# Patient Record
Sex: FEMALE | Race: WHITE | Employment: OTHER | URBAN - NONMETROPOLITAN AREA
[De-identification: names, ages, dates, MRNs, and addresses within clinical notes are randomized per-mention and may not be internally consistent; named-entity substitution may affect disease eponyms.]

---

## 2017-09-20 RX ORDER — SUMATRIPTAN 100 MG/1
100 TABLET, FILM COATED ORAL
Qty: 9 TABLET | Refills: 0 | Status: SHIPPED | OUTPATIENT
Start: 2017-09-20 | End: 2018-02-06 | Stop reason: SDUPTHER

## 2018-02-06 RX ORDER — SUMATRIPTAN 100 MG/1
100 TABLET, FILM COATED ORAL
Qty: 9 TABLET | Refills: 0 | Status: SHIPPED | OUTPATIENT
Start: 2018-02-06 | End: 2018-03-08 | Stop reason: SDUPTHER

## 2018-03-08 ENCOUNTER — OFFICE VISIT (OUTPATIENT)
Dept: FAMILY MEDICINE CLINIC | Age: 63
End: 2018-03-08
Payer: COMMERCIAL

## 2018-03-08 VITALS
HEIGHT: 67 IN | BODY MASS INDEX: 35.09 KG/M2 | RESPIRATION RATE: 12 BRPM | HEART RATE: 68 BPM | SYSTOLIC BLOOD PRESSURE: 126 MMHG | DIASTOLIC BLOOD PRESSURE: 88 MMHG | WEIGHT: 223.6 LBS

## 2018-03-08 DIAGNOSIS — Z12.31 ENCOUNTER FOR SCREENING MAMMOGRAM FOR MALIGNANT NEOPLASM OF BREAST: ICD-10-CM

## 2018-03-08 DIAGNOSIS — R53.83 OTHER FATIGUE: ICD-10-CM

## 2018-03-08 DIAGNOSIS — Z00.00 ANNUAL PHYSICAL EXAM: Primary | ICD-10-CM

## 2018-03-08 DIAGNOSIS — E66.9 CLASS 2 OBESITY WITHOUT SERIOUS COMORBIDITY WITH BODY MASS INDEX (BMI) OF 35.0 TO 35.9 IN ADULT, UNSPECIFIED OBESITY TYPE: ICD-10-CM

## 2018-03-08 DIAGNOSIS — G43.809 OTHER MIGRAINE WITHOUT STATUS MIGRAINOSUS, NOT INTRACTABLE: ICD-10-CM

## 2018-03-08 DIAGNOSIS — Z12.11 SCREENING FOR COLON CANCER: ICD-10-CM

## 2018-03-08 PROBLEM — E66.812 CLASS 2 OBESITY WITHOUT SERIOUS COMORBIDITY WITH BODY MASS INDEX (BMI) OF 35.0 TO 35.9 IN ADULT: Status: ACTIVE | Noted: 2018-03-08

## 2018-03-08 PROCEDURE — 99396 PREV VISIT EST AGE 40-64: CPT | Performed by: FAMILY MEDICINE

## 2018-03-08 RX ORDER — SUMATRIPTAN 100 MG/1
100 TABLET, FILM COATED ORAL
Qty: 9 TABLET | Refills: 5 | Status: SHIPPED | OUTPATIENT
Start: 2018-03-08 | End: 2019-09-12 | Stop reason: SDUPTHER

## 2018-03-08 ASSESSMENT — ENCOUNTER SYMPTOMS
DIARRHEA: 0
EYES NEGATIVE: 1
VOMITING: 0
SHORTNESS OF BREATH: 0
NAUSEA: 0
ABDOMINAL PAIN: 0
BLOOD IN STOOL: 0

## 2018-03-08 ASSESSMENT — PATIENT HEALTH QUESTIONNAIRE - PHQ9
SUM OF ALL RESPONSES TO PHQ9 QUESTIONS 1 & 2: 0
2. FEELING DOWN, DEPRESSED OR HOPELESS: 0
1. LITTLE INTEREST OR PLEASURE IN DOING THINGS: 0
SUM OF ALL RESPONSES TO PHQ QUESTIONS 1-9: 0

## 2018-03-14 ENCOUNTER — TELEPHONE (OUTPATIENT)
Dept: FAMILY MEDICINE CLINIC | Age: 63
End: 2018-03-14

## 2018-03-14 ENCOUNTER — NURSE ONLY (OUTPATIENT)
Dept: FAMILY MEDICINE CLINIC | Age: 63
End: 2018-03-14
Payer: COMMERCIAL

## 2018-03-14 DIAGNOSIS — Z12.11 SCREENING FOR COLON CANCER: Primary | ICD-10-CM

## 2018-03-14 DIAGNOSIS — Z00.00 ANNUAL PHYSICAL EXAM: ICD-10-CM

## 2018-03-14 LAB
CONTROL: POSITIVE
HEMOCCULT STL QL: NEGATIVE

## 2018-03-14 PROCEDURE — 36415 COLL VENOUS BLD VENIPUNCTURE: CPT | Performed by: FAMILY MEDICINE

## 2018-03-14 PROCEDURE — 82274 ASSAY TEST FOR BLOOD FECAL: CPT | Performed by: FAMILY MEDICINE

## 2018-03-15 LAB
ABSOLUTE BASO #: 0 K/UL (ref 0–0.1)
ABSOLUTE EOS #: 0 K/UL (ref 0.1–0.4)
ABSOLUTE LYMPH #: 1 K/UL (ref 0.8–5.2)
ABSOLUTE MONO #: 0.4 K/UL (ref 0.1–0.9)
ABSOLUTE NEUT #: 3.1 K/UL (ref 1.3–9.1)
BASOPHILS RELATIVE PERCENT: 0.4 %
EOSINOPHILS RELATIVE PERCENT: 0.9 %
HCT VFR BLD CALC: 40.6 % (ref 36–48)
HEMOGLOBIN: 13.6 G/DL (ref 12–16)
LYMPHOCYTE %: 22.8 %
MCH RBC QN AUTO: 30.4 PG (ref 27–34)
MCHC RBC AUTO-ENTMCNC: 33.5 G/DL (ref 31–36)
MCV RBC AUTO: 90.8 FL (ref 80–100)
MONOCYTES # BLD: 8.8 %
NEUTROPHILS RELATIVE PERCENT: 66.9 %
PDW BLD-RTO: 13.3 % (ref 10.8–14.8)
PLATELETS: 210 K/UL (ref 150–450)
RBC: 4.47 M/UL (ref 4–5.5)
WBC: 4.6 K/UL (ref 3.7–10.8)

## 2018-03-16 ENCOUNTER — TELEPHONE (OUTPATIENT)
Dept: FAMILY MEDICINE CLINIC | Age: 63
End: 2018-03-16

## 2018-03-16 LAB
ALBUMIN SERPL-MCNC: 4.3 G/DL (ref 3.5–5.2)
ALK PHOSPHATASE: 84 U/L (ref 30–134)
ALT SERPL-CCNC: 19 U/L (ref 5–40)
ANION GAP SERPL CALCULATED.3IONS-SCNC: 10 MEQ/L (ref 10–19)
AST SERPL-CCNC: 18 U/L (ref 9–40)
BILIRUB SERPL-MCNC: 0.4 MG/DL
BUN BLDV-MCNC: 17 MG/DL (ref 8–23)
CALCIUM SERPL-MCNC: 9.3 MG/DL (ref 8.5–10.5)
CHLORIDE BLD-SCNC: 104 MEQ/L (ref 95–107)
CHOLESTEROL/HDL RATIO: 3
CHOLESTEROL: 221 MG/DL
CO2: 27 MEQ/L (ref 19–31)
CREAT SERPL-MCNC: 0.8 MG/DL (ref 0.6–1.3)
EGFR AFRICAN AMERICAN: 91.6 ML/MIN/1.73 M2
EGFR IF NONAFRICAN AMERICAN: 79 ML/MIN/1.73 M2
GLUCOSE: 87 MG/DL (ref 70–99)
HDLC SERPL-MCNC: 73 MG/DL
LDL CHOLESTEROL CALCULATED: 129 MG/DL
LDL/HDL RATIO: 1.8
POTASSIUM SERPL-SCNC: 4.9 MEQ/L (ref 3.5–5.4)
SODIUM BLD-SCNC: 141 MEQ/L (ref 135–146)
T4 FREE: 1.1 NG/DL (ref 0.8–1.9)
TOTAL PROTEIN: 7 G/DL (ref 6.1–8.3)
TRIGL SERPL-MCNC: 95 MG/DL
TSH SERPL DL<=0.05 MIU/L-ACNC: 1.88 UIU/ML (ref 0.4–4.1)
VLDLC SERPL CALC-MCNC: 19 MG/DL

## 2019-09-12 DIAGNOSIS — G43.809 OTHER MIGRAINE WITHOUT STATUS MIGRAINOSUS, NOT INTRACTABLE: ICD-10-CM

## 2019-09-12 RX ORDER — SUMATRIPTAN 100 MG/1
TABLET, FILM COATED ORAL
Qty: 9 TABLET | Refills: 5 | Status: SHIPPED | OUTPATIENT
Start: 2019-09-12 | End: 2020-08-03 | Stop reason: SDUPTHER

## 2019-09-12 NOTE — TELEPHONE ENCOUNTER
Rx sent to pharmacy as below:    Requested Prescriptions     Signed Prescriptions Disp Refills    SUMAtriptan (IMITREX) 100 MG tablet 9 tablet 5     Sig: Take 1 po at the onset of headache;  May repeat in 2 hours x 1 if needed.      Authorizing Provider: Amy Gregorio           Electronically signed by Laya Marcial MD on 9/12/2019 at 10:46 AM

## 2019-09-16 ENCOUNTER — HOSPITAL ENCOUNTER (OUTPATIENT)
Dept: GENERAL RADIOLOGY | Age: 64
Discharge: HOME | End: 2019-09-16
Payer: COMMERCIAL

## 2019-09-16 ENCOUNTER — OFFICE VISIT (OUTPATIENT)
Dept: FAMILY MEDICINE CLINIC | Age: 64
End: 2019-09-16
Payer: COMMERCIAL

## 2019-09-16 ENCOUNTER — HOSPITAL ENCOUNTER (OUTPATIENT)
Age: 64
Discharge: HOME | End: 2019-09-16
Payer: COMMERCIAL

## 2019-09-16 VITALS
WEIGHT: 230 LBS | HEART RATE: 76 BPM | DIASTOLIC BLOOD PRESSURE: 84 MMHG | SYSTOLIC BLOOD PRESSURE: 132 MMHG | HEIGHT: 66 IN | BODY MASS INDEX: 36.96 KG/M2 | RESPIRATION RATE: 14 BRPM

## 2019-09-16 DIAGNOSIS — Z00.00 ANNUAL PHYSICAL EXAM: Primary | ICD-10-CM

## 2019-09-16 DIAGNOSIS — R53.83 OTHER FATIGUE: ICD-10-CM

## 2019-09-16 DIAGNOSIS — M54.2 NECK PAIN: ICD-10-CM

## 2019-09-16 DIAGNOSIS — G43.809 OTHER MIGRAINE WITHOUT STATUS MIGRAINOSUS, NOT INTRACTABLE: ICD-10-CM

## 2019-09-16 DIAGNOSIS — Z12.11 SCREENING FOR COLON CANCER: ICD-10-CM

## 2019-09-16 PROCEDURE — 72040 X-RAY EXAM NECK SPINE 2-3 VW: CPT

## 2019-09-16 PROCEDURE — 99396 PREV VISIT EST AGE 40-64: CPT | Performed by: FAMILY MEDICINE

## 2019-09-16 ASSESSMENT — PATIENT HEALTH QUESTIONNAIRE - PHQ9
SUM OF ALL RESPONSES TO PHQ QUESTIONS 1-9: 0
1. LITTLE INTEREST OR PLEASURE IN DOING THINGS: 0
SUM OF ALL RESPONSES TO PHQ QUESTIONS 1-9: 0
SUM OF ALL RESPONSES TO PHQ9 QUESTIONS 1 & 2: 0
2. FEELING DOWN, DEPRESSED OR HOPELESS: 0

## 2019-09-16 ASSESSMENT — ENCOUNTER SYMPTOMS
BLOOD IN STOOL: 0
SHORTNESS OF BREATH: 0
VOMITING: 0
ABDOMINAL PAIN: 0
NAUSEA: 0
EYES NEGATIVE: 1
DIARRHEA: 0

## 2019-09-16 NOTE — PROGRESS NOTES
Chief Complaint   Patient presents with    Annual Exam     dizziness, feels foggy        Melissa Patton is a 62 y. o.female    Pt presents for annual wellness physical exam.        Pt stable since last visit- no new problems for diagnoses listed below:  Patient Active Problem List   Diagnosis    Migraine headache    Mitral valve prolapse    Class 2 obesity without serious comorbidity with body mass index (BMI) of 35.0 to 35.9 in adult     C/o more headaches recently. She related them to her neck issues. C/o neck stiffness and tightness along with arm numbness, weakness and tingling. Her headaches are often posterior. imitrex helps when she uses it. She breaks 100mg tabs in quarters and the small dose helps. She denies any neck injury. C/o fatigue. Due for yearly labs. No changes in family history. Nonsmoker. Body mass index is 37.12 kg/m². Review of Systems   Constitutional: Positive for fatigue and unexpected weight change (gain). Negative for chills and fever. HENT: Negative. Eyes: Negative. Negative for visual disturbance. Respiratory: Negative for shortness of breath. Cardiovascular: Negative for chest pain, palpitations and leg swelling. Gastrointestinal: Negative for abdominal pain, blood in stool, diarrhea, nausea and vomiting. Genitourinary: Negative for dysuria. Musculoskeletal: Positive for neck pain. Negative for arthralgias and myalgias. Skin: Negative for rash. Neurological: Positive for headaches. Negative for dizziness. Hematological: Negative for adenopathy. Psychiatric/Behavioral: Negative. All other systems reviewed and are negative.           OBJECTIVE     /84   Pulse 76   Resp 14   Ht 5' 6\" (1.676 m)   Wt 230 lb (104.3 kg)   BMI 37.12 kg/m²     Wt Readings from Last 3 Encounters:   09/16/19 230 lb (104.3 kg)   03/08/18 223 lb 9.6 oz (101.4 kg)   05/31/16 190 lb (86.2 kg)       Physical Exam   Constitutional: She is

## 2019-09-17 ENCOUNTER — NURSE ONLY (OUTPATIENT)
Dept: FAMILY MEDICINE CLINIC | Age: 64
End: 2019-09-17
Payer: COMMERCIAL

## 2019-09-17 ENCOUNTER — TELEPHONE (OUTPATIENT)
Dept: FAMILY MEDICINE CLINIC | Age: 64
End: 2019-09-17

## 2019-09-17 DIAGNOSIS — R20.2 NUMBNESS AND TINGLING OF BOTH UPPER EXTREMITIES: Primary | ICD-10-CM

## 2019-09-17 DIAGNOSIS — R20.0 NUMBNESS AND TINGLING OF BOTH UPPER EXTREMITIES: Primary | ICD-10-CM

## 2019-09-17 DIAGNOSIS — Z00.00 ANNUAL PHYSICAL EXAM: ICD-10-CM

## 2019-09-17 DIAGNOSIS — R53.83 OTHER FATIGUE: ICD-10-CM

## 2019-09-17 LAB
ALBUMIN SERPL-MCNC: 4.2 G/DL (ref 3.5–5.1)
ALP BLD-CCNC: 83 U/L (ref 38–126)
ALT SERPL-CCNC: 13 U/L (ref 11–66)
ANION GAP SERPL CALCULATED.3IONS-SCNC: 8 MEQ/L (ref 8–16)
AST SERPL-CCNC: 18 U/L (ref 5–40)
BASOPHILS # BLD: 0.6 %
BASOPHILS ABSOLUTE: 0 THOU/MM3 (ref 0–0.1)
BILIRUB SERPL-MCNC: 0.4 MG/DL (ref 0.3–1.2)
BUN BLDV-MCNC: 13 MG/DL (ref 7–22)
CALCIUM SERPL-MCNC: 9.6 MG/DL (ref 8.5–10.5)
CHLORIDE BLD-SCNC: 107 MEQ/L (ref 98–111)
CHOLESTEROL, TOTAL: 216 MG/DL (ref 100–199)
CO2: 28 MEQ/L (ref 23–33)
CREAT SERPL-MCNC: 0.6 MG/DL (ref 0.4–1.2)
EOSINOPHIL # BLD: 1.5 %
EOSINOPHILS ABSOLUTE: 0 THOU/MM3 (ref 0–0.4)
ERYTHROCYTE [DISTWIDTH] IN BLOOD BY AUTOMATED COUNT: 13.7 % (ref 11.5–14.5)
ERYTHROCYTE [DISTWIDTH] IN BLOOD BY AUTOMATED COUNT: 47.1 FL (ref 35–45)
GFR SERPL CREATININE-BSD FRML MDRD: > 90 ML/MIN/1.73M2
GLUCOSE BLD-MCNC: 96 MG/DL (ref 70–108)
HCT VFR BLD CALC: 42.2 % (ref 37–47)
HDLC SERPL-MCNC: 70 MG/DL
HEMOGLOBIN: 13.7 GM/DL (ref 12–16)
IMMATURE GRANS (ABS): 0.01 THOU/MM3 (ref 0–0.07)
IMMATURE GRANULOCYTES: 0 %
LDL CHOLESTEROL CALCULATED: 131 MG/DL
LYMPHOCYTES # BLD: 27.6 %
LYMPHOCYTES ABSOLUTE: 0.9 THOU/MM3 (ref 1–4.8)
MCH RBC QN AUTO: 30.5 PG (ref 26–33)
MCHC RBC AUTO-ENTMCNC: 32.5 GM/DL (ref 32.2–35.5)
MCV RBC AUTO: 94 FL (ref 81–99)
MONOCYTES # BLD: 8.7 %
MONOCYTES ABSOLUTE: 0.3 THOU/MM3 (ref 0.4–1.3)
NUCLEATED RED BLOOD CELLS: 0 /100 WBC
PLATELET # BLD: 195 THOU/MM3 (ref 130–400)
PMV BLD AUTO: 11.8 FL (ref 9.4–12.4)
POTASSIUM SERPL-SCNC: 4.9 MEQ/L (ref 3.5–5.2)
RBC # BLD: 4.49 MILL/MM3 (ref 4.2–5.4)
SEG NEUTROPHILS: 61.3 %
SEGMENTED NEUTROPHILS ABSOLUTE COUNT: 2 THOU/MM3 (ref 1.8–7.7)
SODIUM BLD-SCNC: 143 MEQ/L (ref 135–145)
T4 FREE: 1.24 NG/DL (ref 0.93–1.76)
TOTAL PROTEIN: 7.5 G/DL (ref 6.1–8)
TRIGL SERPL-MCNC: 76 MG/DL (ref 0–199)
TSH SERPL DL<=0.05 MIU/L-ACNC: 1.57 UIU/ML (ref 0.4–4.2)
WBC # BLD: 3.2 THOU/MM3 (ref 4.8–10.8)

## 2019-09-17 PROCEDURE — 36415 COLL VENOUS BLD VENIPUNCTURE: CPT | Performed by: FAMILY MEDICINE

## 2019-09-17 NOTE — PROGRESS NOTES
Blood work drawn today in the office, venous puncture by Sondra Soto, [facility], right arm, pt tolerated well. FIT test and instructions given to Pt. Pt notified, per CG Xrays of the neck show slight disc space narrowing. Would recommend bilateral upper extremity EMG due to her hand and arm numbness. Pt is agreeable to EMG with Dr Pasha Landers, orders placed, they will contact her to schedule.

## 2019-09-18 ENCOUNTER — TELEPHONE (OUTPATIENT)
Dept: FAMILY MEDICINE CLINIC | Age: 64
End: 2019-09-18

## 2019-09-18 DIAGNOSIS — D72.819 LEUKOPENIA, UNSPECIFIED TYPE: Primary | ICD-10-CM

## 2019-09-18 NOTE — TELEPHONE ENCOUNTER
----- Message from Rosa Mustafa MD sent at 9/17/2019  6:56 PM EDT -----  Notify her that her labs look ok except for slightly decreased WBC count at 3.2. Likely a transient decrease. Recheck CBC in 2-3 weeks.  CG

## 2019-09-18 NOTE — TELEPHONE ENCOUNTER
Per HIPAA, message left for pt notifying her of lab results, likely a transient decrease and to recheck CBC in 2-3 weeks.  Lab order mailed to pt

## 2019-10-09 ENCOUNTER — TELEPHONE (OUTPATIENT)
Dept: FAMILY MEDICINE CLINIC | Age: 64
End: 2019-10-09

## 2019-10-09 ENCOUNTER — NURSE ONLY (OUTPATIENT)
Dept: FAMILY MEDICINE CLINIC | Age: 64
End: 2019-10-09
Payer: COMMERCIAL

## 2019-10-09 DIAGNOSIS — D72.819 LEUKOPENIA, UNSPECIFIED TYPE: Primary | ICD-10-CM

## 2019-10-09 DIAGNOSIS — D72.819 LEUKOPENIA, UNSPECIFIED TYPE: ICD-10-CM

## 2019-10-09 LAB
BASOPHILS # BLD: 0.8 %
BASOPHILS ABSOLUTE: 0 THOU/MM3 (ref 0–0.1)
EOSINOPHIL # BLD: 1 %
EOSINOPHILS ABSOLUTE: 0 THOU/MM3 (ref 0–0.4)
ERYTHROCYTE [DISTWIDTH] IN BLOOD BY AUTOMATED COUNT: 13.9 % (ref 11.5–14.5)
ERYTHROCYTE [DISTWIDTH] IN BLOOD BY AUTOMATED COUNT: 48.3 FL (ref 35–45)
HCT VFR BLD CALC: 40.8 % (ref 37–47)
HEMOGLOBIN: 13.3 GM/DL (ref 12–16)
IMMATURE GRANS (ABS): 0 THOU/MM3 (ref 0–0.07)
IMMATURE GRANULOCYTES: 0 %
LYMPHOCYTES # BLD: 25.1 %
LYMPHOCYTES ABSOLUTE: 1 THOU/MM3 (ref 1–4.8)
MCH RBC QN AUTO: 30.7 PG (ref 26–33)
MCHC RBC AUTO-ENTMCNC: 32.6 GM/DL (ref 32.2–35.5)
MCV RBC AUTO: 94.2 FL (ref 81–99)
MONOCYTES # BLD: 12.6 %
MONOCYTES ABSOLUTE: 0.5 THOU/MM3 (ref 0.4–1.3)
NUCLEATED RED BLOOD CELLS: 0 /100 WBC
PLATELET # BLD: 191 THOU/MM3 (ref 130–400)
PMV BLD AUTO: 12.8 FL (ref 9.4–12.4)
RBC # BLD: 4.33 MILL/MM3 (ref 4.2–5.4)
SEG NEUTROPHILS: 60.5 %
SEGMENTED NEUTROPHILS ABSOLUTE COUNT: 2.3 THOU/MM3 (ref 1.8–7.7)
WBC # BLD: 3.8 THOU/MM3 (ref 4.8–10.8)

## 2019-10-09 PROCEDURE — 36415 COLL VENOUS BLD VENIPUNCTURE: CPT | Performed by: FAMILY MEDICINE

## 2019-10-15 ENCOUNTER — PROCEDURE VISIT (OUTPATIENT)
Dept: NEUROLOGY | Age: 64
End: 2019-10-15
Payer: COMMERCIAL

## 2019-10-15 ENCOUNTER — TELEPHONE (OUTPATIENT)
Dept: FAMILY MEDICINE CLINIC | Age: 64
End: 2019-10-15

## 2019-10-15 DIAGNOSIS — G56.01 RIGHT CARPAL TUNNEL SYNDROME: Primary | ICD-10-CM

## 2019-10-15 DIAGNOSIS — H53.133 SUDDEN VISUAL LOSS OF BOTH EYES: Primary | ICD-10-CM

## 2019-10-15 DIAGNOSIS — R20.0 BILATERAL HAND NUMBNESS: ICD-10-CM

## 2019-10-15 PROCEDURE — 95886 MUSC TEST DONE W/N TEST COMP: CPT | Performed by: PSYCHIATRY & NEUROLOGY

## 2019-10-15 PROCEDURE — 95911 NRV CNDJ TEST 9-10 STUDIES: CPT | Performed by: PSYCHIATRY & NEUROLOGY

## 2019-10-22 ENCOUNTER — TELEPHONE (OUTPATIENT)
Dept: FAMILY MEDICINE CLINIC | Age: 64
End: 2019-10-22

## 2019-10-23 ENCOUNTER — INITIAL CONSULT (OUTPATIENT)
Dept: NEUROLOGY | Age: 64
End: 2019-10-23
Payer: COMMERCIAL

## 2019-10-23 VITALS
HEIGHT: 66 IN | DIASTOLIC BLOOD PRESSURE: 82 MMHG | WEIGHT: 227 LBS | SYSTOLIC BLOOD PRESSURE: 128 MMHG | BODY MASS INDEX: 36.48 KG/M2 | HEART RATE: 72 BPM

## 2019-10-23 DIAGNOSIS — G43.009 MIGRAINE WITHOUT AURA AND WITHOUT STATUS MIGRAINOSUS, NOT INTRACTABLE: ICD-10-CM

## 2019-10-23 DIAGNOSIS — H53.133 VISION, LOSS, SUDDEN, BILATERAL: Primary | ICD-10-CM

## 2019-10-23 PROCEDURE — 99204 OFFICE O/P NEW MOD 45 MIN: CPT | Performed by: PSYCHIATRY & NEUROLOGY

## 2019-10-28 ENCOUNTER — TELEPHONE (OUTPATIENT)
Dept: FAMILY MEDICINE CLINIC | Age: 64
End: 2019-10-28

## 2019-10-29 ENCOUNTER — HOSPITAL ENCOUNTER (OUTPATIENT)
Dept: INTERVENTIONAL RADIOLOGY/VASCULAR | Age: 64
Discharge: HOME | End: 2019-10-29
Payer: COMMERCIAL

## 2019-10-29 DIAGNOSIS — H53.129 TRANSIENT VISUAL LOSS, UNSPECIFIED LATERALITY: ICD-10-CM

## 2019-10-29 PROCEDURE — 93880 EXTRACRANIAL BILAT STUDY: CPT

## 2019-10-31 ENCOUNTER — OFFICE VISIT (OUTPATIENT)
Dept: FAMILY MEDICINE CLINIC | Age: 64
End: 2019-10-31
Payer: COMMERCIAL

## 2019-10-31 VITALS
SYSTOLIC BLOOD PRESSURE: 132 MMHG | HEART RATE: 76 BPM | DIASTOLIC BLOOD PRESSURE: 88 MMHG | RESPIRATION RATE: 16 BRPM | WEIGHT: 226 LBS | BODY MASS INDEX: 36.48 KG/M2

## 2019-10-31 DIAGNOSIS — H35.60 RETINAL HEMORRHAGE, UNSPECIFIED LATERALITY: ICD-10-CM

## 2019-10-31 DIAGNOSIS — H53.123 TRANSIENT VISUAL LOSS OF BOTH EYES: Primary | ICD-10-CM

## 2019-10-31 LAB — HBA1C MFR BLD: 5.3 % (ref 4.3–5.7)

## 2019-10-31 PROCEDURE — 83036 HEMOGLOBIN GLYCOSYLATED A1C: CPT | Performed by: FAMILY MEDICINE

## 2019-10-31 PROCEDURE — 99213 OFFICE O/P EST LOW 20 MIN: CPT | Performed by: FAMILY MEDICINE

## 2019-10-31 ASSESSMENT — ENCOUNTER SYMPTOMS
SHORTNESS OF BREATH: 0
GASTROINTESTINAL NEGATIVE: 1

## 2019-11-12 ENCOUNTER — HOSPITAL ENCOUNTER (OUTPATIENT)
Dept: MRI IMAGING | Age: 64
Discharge: HOME | End: 2019-11-12
Payer: COMMERCIAL

## 2019-11-12 DIAGNOSIS — G43.009 MIGRAINE WITHOUT AURA AND WITHOUT STATUS MIGRAINOSUS, NOT INTRACTABLE: ICD-10-CM

## 2019-11-12 DIAGNOSIS — H53.133 VISION, LOSS, SUDDEN, BILATERAL: ICD-10-CM

## 2019-11-12 PROCEDURE — 6360000004 HC RX CONTRAST MEDICATION: Performed by: NURSE PRACTITIONER

## 2019-11-12 PROCEDURE — A9579 GAD-BASE MR CONTRAST NOS,1ML: HCPCS | Performed by: NURSE PRACTITIONER

## 2019-11-12 PROCEDURE — 70553 MRI BRAIN STEM W/O & W/DYE: CPT

## 2019-11-12 RX ADMIN — GADOTERIDOL 20 ML: 279.3 INJECTION, SOLUTION INTRAVENOUS at 12:48

## 2019-11-13 ENCOUNTER — TELEPHONE (OUTPATIENT)
Dept: NEUROLOGY | Age: 64
End: 2019-11-13

## 2019-11-21 ENCOUNTER — TELEPHONE (OUTPATIENT)
Dept: FAMILY MEDICINE CLINIC | Age: 64
End: 2019-11-21

## 2019-11-21 ENCOUNTER — NURSE ONLY (OUTPATIENT)
Dept: FAMILY MEDICINE CLINIC | Age: 64
End: 2019-11-21

## 2019-11-21 DIAGNOSIS — H53.123 TRANSIENT VISUAL LOSS OF BOTH EYES: Primary | ICD-10-CM

## 2019-11-21 DIAGNOSIS — H53.133 VISION, LOSS, SUDDEN, BILATERAL: ICD-10-CM

## 2019-11-21 DIAGNOSIS — G43.009 MIGRAINE WITHOUT AURA AND WITHOUT STATUS MIGRAINOSUS, NOT INTRACTABLE: ICD-10-CM

## 2019-11-21 DIAGNOSIS — D72.819 LEUKOPENIA, UNSPECIFIED TYPE: ICD-10-CM

## 2019-11-21 LAB
BASOPHILS # BLD: 0.8 %
BASOPHILS ABSOLUTE: 0 THOU/MM3 (ref 0–0.1)
EOSINOPHIL # BLD: 1.5 %
EOSINOPHILS ABSOLUTE: 0.1 THOU/MM3 (ref 0–0.4)
ERYTHROCYTE [DISTWIDTH] IN BLOOD BY AUTOMATED COUNT: 13.4 % (ref 11.5–14.5)
ERYTHROCYTE [DISTWIDTH] IN BLOOD BY AUTOMATED COUNT: 45.8 FL (ref 35–45)
FOLATE: 9.7 NG/ML (ref 4.8–24.2)
HCT VFR BLD CALC: 40.6 % (ref 37–47)
HEMOGLOBIN: 13.5 GM/DL (ref 12–16)
IMMATURE GRANS (ABS): 0.01 THOU/MM3 (ref 0–0.07)
IMMATURE GRANULOCYTES: 0.3 %
LYMPHOCYTES # BLD: 29.2 %
LYMPHOCYTES ABSOLUTE: 1.2 THOU/MM3 (ref 1–4.8)
MCH RBC QN AUTO: 31.2 PG (ref 26–33)
MCHC RBC AUTO-ENTMCNC: 33.3 GM/DL (ref 32.2–35.5)
MCV RBC AUTO: 93.8 FL (ref 81–99)
MONOCYTES # BLD: 10.8 %
MONOCYTES ABSOLUTE: 0.4 THOU/MM3 (ref 0.4–1.3)
NUCLEATED RED BLOOD CELLS: 0 /100 WBC
PLATELET # BLD: 190 THOU/MM3 (ref 130–400)
PMV BLD AUTO: 11.7 FL (ref 9.4–12.4)
RBC # BLD: 4.33 MILL/MM3 (ref 4.2–5.4)
SEG NEUTROPHILS: 57.4 %
SEGMENTED NEUTROPHILS ABSOLUTE COUNT: 2.3 THOU/MM3 (ref 1.8–7.7)
VITAMIN B-12: 532 PG/ML (ref 211–911)
WBC # BLD: 4 THOU/MM3 (ref 4.8–10.8)

## 2019-11-26 ENCOUNTER — OFFICE VISIT (OUTPATIENT)
Dept: NEUROLOGY | Age: 64
End: 2019-11-26
Payer: COMMERCIAL

## 2019-11-26 VITALS
DIASTOLIC BLOOD PRESSURE: 66 MMHG | SYSTOLIC BLOOD PRESSURE: 122 MMHG | HEART RATE: 62 BPM | WEIGHT: 226 LBS | HEIGHT: 66 IN | BODY MASS INDEX: 36.32 KG/M2

## 2019-11-26 DIAGNOSIS — G43.109 COMPLICATED MIGRAINE: Primary | ICD-10-CM

## 2019-11-26 PROCEDURE — 99213 OFFICE O/P EST LOW 20 MIN: CPT | Performed by: NURSE PRACTITIONER

## 2019-11-29 ENCOUNTER — HOSPITAL ENCOUNTER (OUTPATIENT)
Dept: NON INVASIVE DIAGNOSTICS | Age: 64
Discharge: HOME | End: 2019-11-29
Payer: COMMERCIAL

## 2019-11-29 DIAGNOSIS — H53.133 VISION, LOSS, SUDDEN, BILATERAL: ICD-10-CM

## 2019-11-29 DIAGNOSIS — H53.123 TRANSIENT VISUAL LOSS OF BOTH EYES: ICD-10-CM

## 2019-11-29 LAB
LV EF: 63 %
LVEF MODALITY: NORMAL

## 2019-11-29 PROCEDURE — 93306 TTE W/DOPPLER COMPLETE: CPT

## 2019-12-02 ENCOUNTER — TELEPHONE (OUTPATIENT)
Dept: FAMILY MEDICINE CLINIC | Age: 64
End: 2019-12-02

## 2020-08-03 RX ORDER — SUMATRIPTAN 100 MG/1
TABLET, FILM COATED ORAL
Qty: 9 TABLET | Refills: 1 | Status: SHIPPED | OUTPATIENT
Start: 2020-08-03 | End: 2020-09-28

## 2020-08-03 NOTE — TELEPHONE ENCOUNTER
Rx EP'd to pharmacy. Please notify patient. Requested Prescriptions     Signed Prescriptions Disp Refills    SUMAtriptan (IMITREX) 100 MG tablet 9 tablet 1     Sig: Take 1 po at the onset of headache;  May repeat in 2 hours x 1 if needed.      Authorizing Provider: Jessica Chicas           Electronically signed by Herbert Garcia MD on 8/3/2020 at 5:26 PM

## 2020-09-28 RX ORDER — SUMATRIPTAN 100 MG/1
TABLET, FILM COATED ORAL
Qty: 9 TABLET | Refills: 0 | Status: SHIPPED | OUTPATIENT
Start: 2020-09-28 | End: 2021-01-11 | Stop reason: SDUPTHER

## 2020-10-26 RX ORDER — SUMATRIPTAN 100 MG/1
TABLET, FILM COATED ORAL
Qty: 9 TABLET | Refills: 0 | Status: CANCELLED | OUTPATIENT
Start: 2020-10-26

## 2020-10-26 NOTE — TELEPHONE ENCOUNTER
This medication refill is regarding a refill  Refill requested by [facility]     Requested Prescriptions     Pending Prescriptions Disp Refills    SUMAtriptan (IMITREX) 100 MG tablet 9 tablet 0     Sig: TAKE 1 AT THE ONSET OF HEADACHE MAY REPEAT IN 2 HOURS X 1 IF NEEDED.        Date of last visit: Visit date not found  Date of next visit: Visit date not found  Date of last refill: 9/28/20  Pharmacy Name: [address] [place] [address]

## 2020-10-28 NOTE — TELEPHONE ENCOUNTER
Last seen 10/31/19.  No future appt    Called pt- she is not at home to see when she is available to schedule appt, she will call back to set up appt

## 2021-01-11 ENCOUNTER — OFFICE VISIT (OUTPATIENT)
Dept: FAMILY MEDICINE CLINIC | Age: 66
End: 2021-01-11
Payer: MEDICARE

## 2021-01-11 VITALS
HEART RATE: 84 BPM | BODY MASS INDEX: 37.12 KG/M2 | SYSTOLIC BLOOD PRESSURE: 112 MMHG | RESPIRATION RATE: 20 BRPM | DIASTOLIC BLOOD PRESSURE: 84 MMHG | WEIGHT: 230 LBS | TEMPERATURE: 97.6 F

## 2021-01-11 DIAGNOSIS — H61.21 EXCESSIVE CERUMEN IN RIGHT EAR CANAL: ICD-10-CM

## 2021-01-11 DIAGNOSIS — Z00.00 ANNUAL PHYSICAL EXAM: Primary | ICD-10-CM

## 2021-01-11 DIAGNOSIS — Z12.31 ENCOUNTER FOR SCREENING MAMMOGRAM FOR MALIGNANT NEOPLASM OF BREAST: ICD-10-CM

## 2021-01-11 DIAGNOSIS — R10.32 CHRONIC LLQ PAIN: ICD-10-CM

## 2021-01-11 DIAGNOSIS — G43.809 OTHER MIGRAINE WITHOUT STATUS MIGRAINOSUS, NOT INTRACTABLE: ICD-10-CM

## 2021-01-11 DIAGNOSIS — G89.29 CHRONIC LLQ PAIN: ICD-10-CM

## 2021-01-11 PROCEDURE — G0402 INITIAL PREVENTIVE EXAM: HCPCS | Performed by: FAMILY MEDICINE

## 2021-01-11 RX ORDER — IBUPROFEN 200 MG
200 TABLET ORAL EVERY 6 HOURS PRN
COMMUNITY

## 2021-01-11 RX ORDER — SUMATRIPTAN 100 MG/1
TABLET, FILM COATED ORAL
Qty: 9 TABLET | Refills: 11 | Status: SHIPPED | OUTPATIENT
Start: 2021-01-11 | End: 2022-05-24 | Stop reason: SDUPTHER

## 2021-01-11 RX ORDER — ACETAMINOPHEN 500 MG
500 TABLET ORAL EVERY 6 HOURS PRN
COMMUNITY

## 2021-01-11 ASSESSMENT — ENCOUNTER SYMPTOMS
EYES NEGATIVE: 1
ABDOMINAL PAIN: 1
DIARRHEA: 0
VOMITING: 0
NAUSEA: 0
SHORTNESS OF BREATH: 0
BLOOD IN STOOL: 0
BACK PAIN: 0

## 2021-01-11 ASSESSMENT — PATIENT HEALTH QUESTIONNAIRE - PHQ9
1. LITTLE INTEREST OR PLEASURE IN DOING THINGS: 0
SUM OF ALL RESPONSES TO PHQ QUESTIONS 1-9: 0
SUM OF ALL RESPONSES TO PHQ9 QUESTIONS 1 & 2: 0

## 2021-01-11 NOTE — PROGRESS NOTES
Per order of CG a right ear irrigation was performed using warm water. A large amount of cerumen was removed, cerumen spoon and Alligator forceps used. TM visualized. Pt tolerated well.

## 2021-01-11 NOTE — PATIENT INSTRUCTIONS
Patient Education        Well Visit, Women 48 to 72: Care Instructions  Your Care Instructions     Physical exams can help you stay healthy. Your doctor has checked your overall health and may have suggested ways to take good care of yourself. He or she also may have recommended tests. At home, you can help prevent illness with healthy eating, regular exercise, and other steps. Follow-up care is a key part of your treatment and safety. Be sure to make and go to all appointments, and call your doctor if you are having problems. It's also a good idea to know your test results and keep a list of the medicines you take. How can you care for yourself at home? · Reach and stay at a healthy weight. This will lower your risk for many problems, such as obesity, diabetes, heart disease, and high blood pressure. · Get at least 30 minutes of exercise on most days of the week. Walking is a good choice. You also may want to do other activities, such as running, swimming, cycling, or playing tennis or team sports. · Do not smoke. Smoking can make health problems worse. If you need help quitting, talk to your doctor about stop-smoking programs and medicines. These can increase your chances of quitting for good. · Protect your skin from too much sun. When you're outdoors from 10 a.m. to 4 p.m., stay in the shade or cover up with clothing and a hat with a wide brim. Wear sunglasses that block UV rays. Even when it's cloudy, put broad-spectrum sunscreen (SPF 30 or higher) on any exposed skin. · See a dentist one or two times a year for checkups and to have your teeth cleaned. · Wear a seat belt in the car. Follow your doctor's advice about when to have certain tests. These tests can spot problems early. · Cholesterol. Your doctor will tell you how often to have this done based on your age, family history, or other things that can increase your risk for heart attack and stroke. · Blood pressure.  Have your blood pressure checked during a routine doctor visit. Your doctor will tell you how often to check your blood pressure based on your age, your blood pressure results, and other factors. · Mammogram. Ask your doctor how often you should have a mammogram, which is an X-ray of your breasts. A mammogram can spot breast cancer before it can be felt and when it is easiest to treat. · Pap test and pelvic exam. Ask your doctor how often you should have a Pap test. You may not need to have a Pap test as often as you used to. · Vision. Have your eyes checked every year or two or as often as your doctor suggests. Some experts recommend that you have yearly exams for glaucoma and other age-related eye problems starting at age 48. · Hearing. Tell your doctor if you notice any change in your hearing. You can have tests to find out how well you hear. · Diabetes. Ask your doctor whether you should have tests for diabetes. · Colorectal cancer. Your risk for colorectal cancer gets higher as you get older. Some experts say that adults should start regular screening at age 48 and stop at age 76. Others say to start before age 48 or continue after age 76. Talk with your doctor about your risk and when to start and stop screening. · Thyroid disease. Talk to your doctor about whether to have your thyroid checked as part of a regular physical exam. Women have an increased chance of a thyroid problem. · Osteoporosis. You should begin tests for bone density at age 72. If you are younger than 72, ask your doctor whether you have factors that may increase your risk for this disease. You may want to have this test before age 72. · Heart attack and stroke risk. At least every 4 to 6 years, you should have your risk for heart attack and stroke assessed. Your doctor uses factors such as your age, blood pressure, cholesterol, and whether you smoke or have diabetes to show what your risk for a heart attack or stroke is over the next 10 years.   When should you call for help? Watch closely for changes in your health, and be sure to contact your doctor if you have any problems or symptoms that concern you. Where can you learn more? Go to https://Stockdrifteusebio.healthTable8. org and sign in to your [employer] account. Enter Z158 in the [employer] box to learn more about \"Well Visit, Women 50 to 72: Care Instructions. \"     If you do not have an account, please click on the \"Sign Up Now\" link. Current as of: May 27, 2020               Content Version: 12.6  © 7766-9653 [employer]. Care instructions adapted under license by [facility] ([facility]). If you have questions about a medical condition or this instruction, always ask your healthcare professional. [address] any warranty or liability for your use of this information.

## 2021-01-11 NOTE — PROGRESS NOTES
2021    Saima Tai (:  1955) is a 60 y.o. female, here for a preventive medicine evaluation. Patient Active Problem List   Diagnosis    Migraine    Mitral valve prolapse    Class 2 obesity without serious comorbidity with body mass index (BMI) of 37.0 to 37.9 in adult     Patient c/o intermittent LLQ pain over the last 10 months. Pain is worse with walking and better with sitting. Pain happens about once a week, is severe, and last 15-30 minutes. No associated nausea, vomiting, diarrhea. No bloody stool or urine. No dysuria or back pain. Pain is sharp and radiates from the LLQ to the RLQ. No fever. Her migraines are becoming more frequent. She has them 3 times per week. She takes imitrex and her symptoms resolve. She declines a preventive med at this point since her imitrex works so well. Overdue for labs, mammogram.  Has FIT test for colon cancer screening at home but hasn't done it yet. Nonsmoker. Body mass index is 37.12 kg/m². Review of Systems   Constitutional: Negative for chills, fatigue, fever and unexpected weight change. HENT: Negative. Eyes: Negative. Respiratory: Negative for shortness of breath. Cardiovascular: Negative for chest pain, palpitations and leg swelling. Gastrointestinal: Positive for abdominal pain. Negative for blood in stool, diarrhea, nausea and vomiting. Genitourinary: Negative for dysuria and hematuria. Musculoskeletal: Negative for arthralgias, back pain and myalgias. Skin: Negative for rash. Neurological: Positive for headaches. Negative for dizziness. Hematological: Negative for adenopathy. Psychiatric/Behavioral: Negative. All other systems reviewed and are negative. Prior to Visit Medications    Medication Sig Taking?  Authorizing Provider   ibuprofen (ADVIL;MOTRIN) 200 MG tablet Take 200 mg by mouth every 6 hours as needed for Pain Yes Historical Provider, MD   acetaminophen (TYLENOL) 500 MG tablet Take 500 mg by mouth every 6 hours as needed for Pain Yes Historical Provider, MD   SUMAtriptan (IMITREX) 100 MG tablet TAKE 1 AT THE ONSET OF HEADACHE MAY REPEAT IN 2 HOURS X 1 IF NEEDED.  Yes Hipolito Miller MD        Allergies   Allergen Reactions    Sansert      Cardiac arrest       Past Medical History:   Diagnosis Date    Migraine headache     Mitral valve prolapse        Past Surgical History:   Procedure Laterality Date    DILATION AND CURETTAGE OF UTERUS      x 2       Social History     Socioeconomic History    Marital status:      Spouse name: Not on file    Number of children: Not on file    Years of education: Not on file    Highest education level: Not on file   Occupational History    Not on file   Social Needs    Financial resource strain: Not on file    Food insecurity     Worry: Not on file     Inability: Not on file    Transportation needs     Medical: Not on file     Non-medical: Not on file   Tobacco Use    Smoking status: Never Smoker    Smokeless tobacco: Never Used   Substance and Sexual Activity    Alcohol use: No    Drug use: No    Sexual activity: Not Currently   Lifestyle    Physical activity     Days per week: Not on file     Minutes per session: Not on file    Stress: Not on file   Relationships    Social connections     Talks on phone: Not on file     Gets together: Not on file     Attends Mu-ism service: Not on file     Active member of club or organization: Not on file     Attends meetings of clubs or organizations: Not on file     Relationship status: Not on file    Intimate partner violence     Fear of current or ex partner: Not on file     Emotionally abused: Not on file     Physically abused: Not on file     Forced sexual activity: Not on file   Other Topics Concern    Not on file   Social History Narrative    Not on file        Family History   Problem Relation Age of Onset    Cancer Mother         breast    Hypertension Mother    [facility] Cancer Paternal Aunt         Breast    Colon Polyps Father     Coronary Art Dis Father     COPD Father     No Known Problems Brother          Vitals:    01/11/21 1038 01/11/21 1041   BP: (!) 144/86 112/84   Site: Left Upper Arm Right Upper Arm   Pulse: 84    Resp: 20    Temp: 97.6 °F (36.4 °C)    Weight: 230 lb (104.3 kg)      Estimated body mass index is 37.12 kg/m² as calculated from the following:    Height as of 11/26/19: 5' 6\" (1.676 m). Weight as of this encounter: 230 lb (104.3 kg). Physical Exam  Vitals signs and nursing note reviewed. Constitutional:       General: She is not in acute distress. Appearance: She is well-developed. HENT:      Head: Normocephalic and atraumatic. Right Ear: There is impacted cerumen. Left Ear: Tympanic membrane normal.      Mouth/Throat:      Mouth: Mucous membranes are moist.      Pharynx: No posterior oropharyngeal erythema. Eyes:      Conjunctiva/sclera: Conjunctivae normal.   Neck:      Musculoskeletal: Neck supple. Thyroid: No thyromegaly. Vascular: No carotid bruit. Cardiovascular:      Rate and Rhythm: Normal rate and regular rhythm. Heart sounds: No murmur. Pulmonary:      Effort: Pulmonary effort is normal.      Breath sounds: Normal breath sounds. No wheezing. Abdominal:      General: Bowel sounds are normal.      Palpations: Abdomen is soft. Tenderness: There is abdominal tenderness in the left lower quadrant. There is no guarding or rebound. Hernia: No hernia is present. Musculoskeletal:      Right lower leg: No edema. Left lower leg: No edema. Lymphadenopathy:      Cervical: No cervical adenopathy. Skin:     General: Skin is warm and dry. Findings: No rash. Neurological:      Mental Status: She is alert and oriented to person, place, and time.    Psychiatric:         Behavior: Behavior normal.                 Immunization History   Administered Date(s) Administered    Tdap (Boostrix, Adacel) 12/12/2011       Health Maintenance   Topic Date Due    Hepatitis C screen  1955    HIV screen  01/25/1971    Shingles Vaccine (1 of 2) 01/25/2006    Cervical cancer screen  09/01/2017    Colon Cancer Screen FIT/FOBT  03/14/2019    Flu vaccine (1) 01/11/2022 (Originally 9/1/2020)    Breast cancer screen  04/10/2021    DTaP/Tdap/Td vaccine (2 - Td) 12/12/2021    Lipid screen  09/17/2024    Hepatitis A vaccine  Aged Out    Hepatitis B vaccine  Aged Out    Hib vaccine  Aged Out    Meningococcal (ACWY) vaccine  Aged Out    Pneumococcal 0-64 years Vaccine  Aged Out       ASSESSMENT/PLAN:    1. Annual physical exam  -     Comprehensive Metabolic Panel; Future  -     Lipid Panel; Future  -     CBC Auto Differential; Future  2. Other migraine without status migrainosus, not intractable  -     SUMAtriptan (IMITREX) 100 MG tablet; TAKE 1 AT THE ONSET OF HEADACHE MAY REPEAT IN 2 HOURS X 1 IF NEEDED., Disp-9 tablet, R-11Normal  3. Encounter for screening mammogram for malignant neoplasm of breast  -     PEDRO LUIS MIRTHA DIGITAL SCREEN BILATERAL; Future  4. Chronic LLQ pain  -     CT ABDOMEN PELVIS W IV CONTRAST Additional Contrast? Oral; Future  5. Excessive cerumen in right ear canal    Right ear irrigation today. Update mammogram    She will do the FIT test that she has at home    Update labs    CT abd/pelvis due to persistent and worsening LLQ pain over 10months. Continue imitrex for migraine    She declines flu vaccine today    Follow up if not better      An electronic signature was used to authenticate this note.         Electronically signed by Maryse Stephen MD on 1/11/2021 at 11:06 AM

## 2021-01-15 ENCOUNTER — HOSPITAL ENCOUNTER (OUTPATIENT)
Age: 66
Discharge: HOME | End: 2021-01-15
Payer: MEDICARE

## 2021-01-15 DIAGNOSIS — Z00.00 ANNUAL PHYSICAL EXAM: ICD-10-CM

## 2021-01-15 LAB
ALBUMIN SERPL-MCNC: 4.1 G/DL (ref 3.5–5.1)
ALP BLD-CCNC: 73 U/L (ref 38–126)
ALT SERPL-CCNC: 11 U/L (ref 11–66)
ANION GAP SERPL CALCULATED.3IONS-SCNC: 10 MEQ/L (ref 8–16)
AST SERPL-CCNC: 15 U/L (ref 5–40)
BASOPHILS # BLD: 0.3 %
BASOPHILS ABSOLUTE: 0 THOU/MM3 (ref 0–0.1)
BILIRUB SERPL-MCNC: 0.4 MG/DL (ref 0.3–1.2)
BUN BLDV-MCNC: 19 MG/DL (ref 7–22)
CALCIUM SERPL-MCNC: 9.1 MG/DL (ref 8.5–10.5)
CHLORIDE BLD-SCNC: 105 MEQ/L (ref 98–111)
CHOLESTEROL, TOTAL: 204 MG/DL (ref 100–199)
CO2: 27 MEQ/L (ref 23–33)
CREAT SERPL-MCNC: 0.7 MG/DL (ref 0.4–1.2)
EOSINOPHIL # BLD: 1.3 %
EOSINOPHILS ABSOLUTE: 0 THOU/MM3 (ref 0–0.4)
ERYTHROCYTE [DISTWIDTH] IN BLOOD BY AUTOMATED COUNT: 13.4 % (ref 11.5–14.5)
ERYTHROCYTE [DISTWIDTH] IN BLOOD BY AUTOMATED COUNT: 48.1 FL (ref 35–45)
GFR SERPL CREATININE-BSD FRML MDRD: 84 ML/MIN/1.73M2
GLUCOSE BLD-MCNC: 83 MG/DL (ref 70–108)
HCT VFR BLD CALC: 41.6 % (ref 37–47)
HDLC SERPL-MCNC: 69 MG/DL
HEMOGLOBIN: 13.5 GM/DL (ref 12–16)
IMMATURE GRANS (ABS): 0.01 THOU/MM3 (ref 0–0.07)
IMMATURE GRANULOCYTES: 0.3 %
LDL CHOLESTEROL CALCULATED: 121 MG/DL
LYMPHOCYTES # BLD: 24.5 %
LYMPHOCYTES ABSOLUTE: 0.9 THOU/MM3 (ref 1–4.8)
MCH RBC QN AUTO: 31.4 PG (ref 26–33)
MCHC RBC AUTO-ENTMCNC: 32.5 GM/DL (ref 32.2–35.5)
MCV RBC AUTO: 96.7 FL (ref 81–99)
MONOCYTES # BLD: 9.3 %
MONOCYTES ABSOLUTE: 0.4 THOU/MM3 (ref 0.4–1.3)
NUCLEATED RED BLOOD CELLS: 0 /100 WBC
PLATELET # BLD: 178 THOU/MM3 (ref 130–400)
PMV BLD AUTO: 12.2 FL (ref 9.4–12.4)
POTASSIUM SERPL-SCNC: 4.1 MEQ/L (ref 3.5–5.2)
RBC # BLD: 4.3 MILL/MM3 (ref 4.2–5.4)
SEG NEUTROPHILS: 64.3 %
SEGMENTED NEUTROPHILS ABSOLUTE COUNT: 2.4 THOU/MM3 (ref 1.8–7.7)
SODIUM BLD-SCNC: 142 MEQ/L (ref 135–145)
TOTAL PROTEIN: 7 G/DL (ref 6.1–8)
TRIGL SERPL-MCNC: 70 MG/DL (ref 0–199)
WBC # BLD: 3.8 THOU/MM3 (ref 4.8–10.8)

## 2021-01-15 PROCEDURE — 36415 COLL VENOUS BLD VENIPUNCTURE: CPT

## 2021-01-15 PROCEDURE — 80053 COMPREHEN METABOLIC PANEL: CPT

## 2021-01-15 PROCEDURE — 85025 COMPLETE CBC W/AUTO DIFF WBC: CPT

## 2021-01-15 PROCEDURE — 80061 LIPID PANEL: CPT

## 2021-01-26 ENCOUNTER — HOSPITAL ENCOUNTER (OUTPATIENT)
Dept: CT IMAGING | Age: 66
Discharge: HOME | End: 2021-01-26
Payer: MEDICARE

## 2021-01-26 DIAGNOSIS — G89.29 CHRONIC LLQ PAIN: ICD-10-CM

## 2021-01-26 DIAGNOSIS — R10.32 CHRONIC LLQ PAIN: ICD-10-CM

## 2021-01-26 PROCEDURE — 6360000004 HC RX CONTRAST MEDICATION: Performed by: FAMILY MEDICINE

## 2021-01-26 PROCEDURE — 74177 CT ABD & PELVIS W/CONTRAST: CPT

## 2021-01-26 RX ADMIN — IOPAMIDOL 85 ML: 755 INJECTION, SOLUTION INTRAVENOUS at 10:20

## 2021-01-26 RX ADMIN — IOHEXOL 50 ML: 240 INJECTION, SOLUTION INTRATHECAL; INTRAVASCULAR; INTRAVENOUS; ORAL at 10:20

## 2021-01-27 ENCOUNTER — TELEPHONE (OUTPATIENT)
Dept: FAMILY MEDICINE CLINIC | Age: 66
End: 2021-01-27

## 2021-01-27 DIAGNOSIS — R93.5 ABNORMAL CT SCAN, PELVIS: Primary | ICD-10-CM

## 2021-01-27 NOTE — TELEPHONE ENCOUNTER
----- Message from Lilliam Andino MD sent at 1/26/2021  2:52 PM EST -----  Notify her that her CT abd/pelvis shows questionable fibroids in the uterus and left ovarian cyst.  Pelvic ultrasound recommended to further evaluate this. Small hernias noted in both the left and right lower abdomen. Constipation noted. Have her use a stool softener or miralax to get bowels moving and schedule ultrasound as recommended.  CG

## 2021-01-29 ENCOUNTER — HOSPITAL ENCOUNTER (OUTPATIENT)
Dept: ULTRASOUND IMAGING | Age: 66
Discharge: HOME | End: 2021-01-29
Payer: MEDICARE

## 2021-01-29 DIAGNOSIS — R93.5 ABNORMAL CT SCAN, PELVIS: ICD-10-CM

## 2021-01-29 PROCEDURE — 76856 US EXAM PELVIC COMPLETE: CPT

## 2021-02-01 ENCOUNTER — TELEPHONE (OUTPATIENT)
Dept: FAMILY MEDICINE CLINIC | Age: 66
End: 2021-02-01

## 2021-02-01 DIAGNOSIS — N94.9 ADNEXAL CYST: Primary | ICD-10-CM

## 2021-02-01 NOTE — TELEPHONE ENCOUNTER
----- Message from David Wilson MD sent at 1/29/2021  5:14 PM EST -----  Notify her that her pelvic US shows an abnormal left adnexal cyst.  Refer to GYN for further evaluation of this. This is unusual for her age.   CG

## 2021-02-01 NOTE — TELEPHONE ENCOUNTER
Spoke to pt about results. Referral was created for [facility] for life and faxed.

## 2021-03-03 ENCOUNTER — HOSPITAL ENCOUNTER (OUTPATIENT)
Age: 66
Discharge: HOME | End: 2021-03-03
Payer: MEDICARE

## 2021-03-03 LAB
EKG ATRIAL RATE: 69 BPM
EKG P AXIS: 37 DEGREES
EKG P-R INTERVAL: 112 MS
EKG Q-T INTERVAL: 406 MS
EKG QRS DURATION: 88 MS
EKG QTC CALCULATION (BAZETT): 435 MS
EKG R AXIS: 42 DEGREES
EKG T AXIS: 24 DEGREES
EKG VENTRICULAR RATE: 69 BPM

## 2021-03-03 PROCEDURE — 93005 ELECTROCARDIOGRAM TRACING: CPT | Performed by: OBSTETRICS & GYNECOLOGY

## 2021-03-12 ENCOUNTER — OFFICE VISIT (OUTPATIENT)
Dept: CARDIOLOGY CLINIC | Age: 66
End: 2021-03-12
Payer: MEDICARE

## 2021-03-12 VITALS
SYSTOLIC BLOOD PRESSURE: 152 MMHG | BODY MASS INDEX: 36.26 KG/M2 | HEIGHT: 67 IN | HEART RATE: 77 BPM | DIASTOLIC BLOOD PRESSURE: 67 MMHG | WEIGHT: 231 LBS

## 2021-03-12 DIAGNOSIS — R42 DIZZINESS: Primary | ICD-10-CM

## 2021-03-12 DIAGNOSIS — R00.2 PALPITATION: ICD-10-CM

## 2021-03-12 DIAGNOSIS — R94.31 ABNORMAL EKG: ICD-10-CM

## 2021-03-12 PROCEDURE — G8484 FLU IMMUNIZE NO ADMIN: HCPCS | Performed by: INTERNAL MEDICINE

## 2021-03-12 PROCEDURE — 99204 OFFICE O/P NEW MOD 45 MIN: CPT | Performed by: INTERNAL MEDICINE

## 2021-03-12 PROCEDURE — G8427 DOCREV CUR MEDS BY ELIG CLIN: HCPCS | Performed by: INTERNAL MEDICINE

## 2021-03-12 PROCEDURE — G8417 CALC BMI ABV UP PARAM F/U: HCPCS | Performed by: INTERNAL MEDICINE

## 2021-03-12 PROCEDURE — 1090F PRES/ABSN URINE INCON ASSESS: CPT | Performed by: INTERNAL MEDICINE

## 2021-03-12 NOTE — PROGRESS NOTES
[facility]/ [facility]  [address] [address]  Dept: [phone]  Dept Fax: [phone]  Loc: [phone]    Visit Date: 3/12/2021    Ms. Dimitri Chamorro is a 73 y.o. female  who presented for:    New referral for preoperative clearance, abnormal EKG  She reports h/o ovarian cyst, abdominal pain   Patient is followed by Dr Earlie Gilford  She is scheduled for salpingo-oopherectomy next month        HPI:   HPI   Estela Bean is a pleasant 73year old female patient who  has a past medical history of Migraine headache and Mitral valve prolapse. Echo 2019 revealed EF 60-65%, mild MR, mild TR. She reports h/o ovarian cyst, abdominal pain. She is scheduled for salpingo-oopherectomy next month. She was referred to cardiology after she was found to have an abnormal EKG, preoperative risk assessment was requested. EKG revealed SR, nonspecific ST depressions in anterolateral leads. She reports no significant limitation in performing her ADLs. However, she reports having what she describes as \"having little energy\". The patient has occasional palpitations and dizziness. Patient denies chest pain, shortness of breath, dyspnea on exertion, orthopnea, paroxysmal nocturnal dyspnea, syncope, weight gain or leg swelling. Current Outpatient Medications:     ibuprofen (ADVIL;MOTRIN) 200 MG tablet, Take 200 mg by mouth every 6 hours as needed for Pain, Disp: , Rfl:     acetaminophen (TYLENOL) 500 MG tablet, Take 500 mg by mouth every 6 hours as needed for Pain, Disp: , Rfl:     SUMAtriptan (IMITREX) 100 MG tablet, TAKE 1 AT THE ONSET OF HEADACHE MAY REPEAT IN 2 HOURS X 1 IF NEEDED., Disp: 9 tablet, Rfl: 11    Past Medical History  Gilda MIRANDA  has a past medical history of Migraine headache and Mitral valve prolapse. Social History  Gilda MIRANDA  reports that she has never smoked. She has never used smokeless tobacco. She reports that she does not drink alcohol or use drugs. Family History  Jeremy MIRANDA family history includes COPD in her father; Cancer in her mother and paternal aunt; Colon Polyps in her father; Coronary Art Dis in her father; Hypertension in her mother; No Known Problems in her brother. Past Surgical History   Past Surgical History:   Procedure Laterality Date    DILATION AND CURETTAGE OF UTERUS      x 2       Review of Systems   Constitutional: Negative for chills and fever  HENT: Negative for congestion, sinus pressure, sneezing and sore throat. Eyes: Negative for pain, discharge, redness and itching. Respiratory: Negative for apnea, cough  Gastrointestinal: Negative for blood in stool, constipation, diarrhea   Endocrine: Negative for cold intolerance, heat intolerance, polydipsia. Genitourinary: Negative for dysuria, enuresis, flank pain and hematuria. Musculoskeletal: Negative for arthralgias, joint swelling and neck pain. Neurological: Negative for numbness and headaches. Psychiatric/Behavioral: Negative for agitation, confusion, decreased concentration and dysphoric mood. Objective: There were no vitals taken for this visit. Wt Readings from Last 3 Encounters:   01/11/21 230 lb (104.3 kg)   11/26/19 226 lb (102.5 kg)   10/31/19 226 lb (102.5 kg)     BP Readings from Last 3 Encounters:   01/11/21 112/84   11/26/19 122/66   10/31/19 132/88       Nursing note and vitals reviewed. Physical Exam   Constitutional: Oriented to person, place, and time. Appears well-developed and well-nourished. ENT: Moist mucous membranes. No bleeding. Tongue is midline. Head: Normocephalic and atraumatic. Eyes: EOM are normal. Pupils are equal, round, and reactive to light. Neck: Normal range of motion. Neck supple. No JVD present. Cardiovascular: Normal rate, regular rhythm, II/VI murmur, no rubs, and intact distal pulses. Pulmonary/Chest: Effort normal and breath sounds normal. No respiratory distress. No wheezes. No rales. Abdominal: Soft. Bowel sounds are normal. No distension. There is no tenderness. Musculoskeletal: Normal range of motion. no edema. Neurological: Alert and oriented to person, place, and time. No cranial nerve deficit. Coordination normal.   Skin: Skin is warm and dry. Psychiatric: Normal mood and affect.        No results found for: CKTOTAL, CKMB, CKMBINDEX    Lab Results   Component Value Date    WBC 3.8 01/15/2021    RBC 4.30 01/15/2021    RBC 4.47 03/14/2018    HGB 13.5 01/15/2021    HCT 41.6 01/15/2021    MCV 96.7 01/15/2021    MCH 31.4 01/15/2021    MCHC 32.5 01/15/2021    RDW 13.3 03/14/2018     01/15/2021    MPV 12.2 01/15/2021       Lab Results   Component Value Date     01/15/2021    K 4.1 01/15/2021     01/15/2021    CO2 27 01/15/2021    BUN 19 01/15/2021    LABALBU 4.1 01/15/2021    CREATININE 0.7 01/15/2021    CALCIUM 9.1 01/15/2021    LABGLOM 84 01/15/2021    GLUCOSE 83 01/15/2021    GLUCOSE 87 03/14/2018       Lab Results   Component Value Date    ALKPHOS 73 01/15/2021    ALT 11 01/15/2021    AST 15 01/15/2021    PROT 7.0 01/15/2021    BILITOT 0.4 01/15/2021    LABALBU 4.1 01/15/2021       No results found for: MG    No results found for: INR, PROTIME      Lab Results   Component Value Date    LABA1C 5.3 10/31/2019       Lab Results   Component Value Date    TRIG 70 01/15/2021    HDL 69 01/15/2021    LDLCALC 121 01/15/2021    LABVLDL 19 03/14/2018       Lab Results   Component Value Date    TSH 1.570 09/17/2019         Testing Reviewed:      I have individually reviewed the cardiac test below:    ECHO:   Results for orders placed during the hospital encounter of 11/29/19   ECHO Complete 2D W Doppler W Color    Narrative Transthoracic Echocardiography Report (TTE)     Demographics      Patient Name   [facility]  Gender               Female                  D      MR #           [id number]        Race                                                       Ethnicity      Account # [id number]        Room Number      Accession      [id number]        Date of Study        11/29/2019   Number      Date of Birth  1955       Referring Physician  Brent Rodney MD      Age            62 year(s)       [address] [facility]                                      Interpreting         Echo reader of the                                   Physician            week                                                        Miguel Garcia MD     Procedure    Type of Study      TTE procedure:ECHOCARDIOGRAM COMPLETE 2D W DOPPLER W COLOR. Procedure Date  Date: 11/29/2019 Start: 10:06 AM    Study Location: Echo Lab  Technical Quality: Limited visualization due to poor acoustical window. Indications:Transient vision loss. Additional Medical History:Migraines, MVP, obesity    Patient Status: Routine    Height: 66 inches Weight: 226 pounds BSA: 2.11 m^2 BMI: 36.48 kg/m^2    BP: 122/66 mmHg     Conclusions      Summary   Normal left ventricle size and systolic function. Ejection fraction was   estimated at 60 to 65 %. There were no regional left ventricular wall   motion abnormalities and wall thickness was within normal limits. Signature      ----------------------------------------------------------------   Electronically signed by Miguel Garcia MD (Interpreting   physician) on 11/29/2019 at 05:43 PM   ----------------------------------------------------------------      Findings      Mitral Valve   The mitral valve structure was normal with normal leaflet separation. DOPPLER: The transmitral velocity was within the normal range with no   evidence for mitral stenosis. Mild mitral regurgitation is present. Aortic Valve   The aortic valve was trileaflet with normal thickness and cuspal   separation. DOPPLER: Transaortic velocity was within the normal range with   no evidence of aortic stenosis.  There was no evidence of aortic   regurgitation. Tricuspid Valve   The tricuspid valve structure was normal with normal leaflet separation. DOPPLER: There was no evidence of tricuspid stenosis. Mild tricuspid regurgitation visualized. Pulmonic Valve   The pulmonic valve leaflets exhibited normal thickness, no calcification,   and normal cuspal separation. DOPPLER: The transpulmonic velocity was   within the normal range with no evidence for regurgitation. Left Atrium   Left atrial size was normal.      Left Ventricle   Normal left ventricle size and systolic function. Ejection fraction was   estimated at 60 to 65 %. There were no regional left ventricular wall   motion abnormalities and wall thickness was within normal limits. Right Atrium   Right atrial size was normal.      Right Ventricle   The right ventricular size was normal with normal systolic function and   wall thickness. Pericardial Effusion   The pericardium was normal in appearance with no evidence of a pericardial   effusion. Pleural Effusion   No evidence of pleural effusion. Aorta / Great Vessels   -Aortic root dimension within normal limits.   -The Pulmonary artery is within normal limits. -IVC size is within normal limits with normal respiratory phasic changes.      M-Mode/2D Measurements & Calculations      LV Diastolic    LV Systolic Dimension: 3.2  AV Cusp Separation: 2.4 cmLA   Dimension: 4.5  cm                          Dimension: 3.7 cmAO Root   cm              LV Volume Diastolic: 46.4   Dimension: 3.5 cm   LV FS:28.9 %    ml   LV PW           LV Volume Systolic: 41 ml   Diastolic: 0.9  LV EDV/LV EDV Index: 92.4   cm              ml/44 m^2LV ESV/LV ESV      RV Diastolic Dimension: 2.3 cm   Septum          Index: 41 GF/94 m^2   Diastolic: 1 cm EF Calculated: 55.6 %       LA/Aorta: 1.06     Doppler Measurements & Calculations      MV Peak E-Wave: 68.3 cm/s  AV Peak Velocity: 119  LVOT Peak Velocity: 92   MV Peak A-Wave: 88.6 cm/s  cm/s                   cm/s   MV E/A Ratio: 0.77         AV Peak Gradient: 5.66 LVOT Peak Gradient: 3   MV Peak Gradient: 1.87     mmHg                   mmHg   mmHg                                                     TV Peak E-Wave: 53.4   MV Deceleration Time: 225                         cm/s   msec                                              TV Peak A-Wave: 60.2                                                     cm/s      MV E' Septal Velocity: 5.4                        TV Peak Gradient: 1.14   cm/s                       AV DVI (Vmax):0.77     mmHg   MV A' Septal Velocity:                            TR Velocity:213 cm/s   10.6 cm/s                                         TR Gradient:18.15 mmHg   MV E' Lateral Velocity: 6                         PV Peak Velocity: 52.1   cm/s                                              cm/s   MV A' Lateral Velocity:                           PV Peak Gradient: 1.09   13.2 cm/s                                         mmHg   E/E' septal: 12.65   E/E' lateral: 11.38   MR Velocity: 557 cm/s     [email or web address] [id number]        Ekg:    AssessmentPlan:   Henrique Gordon is a pleasant 73year old female patient who  has a past medical history of Migraine headache and Mitral valve prolapse. Echo 2019 revealed EF 60-65%, mild MR, mild TR. She reports h/o ovarian cyst, abdominal pain. She is scheduled for salpingo-oopherectomy next month. She was referred to cardiology after she was found to have an abnormal EKG, preoperative risk assessment was requested. EKG revealed SR, nonspecific ST depressions in anterolateral leads. She reports no significant limitation in performing her ADLs. However, she reports having what she describes as \"having little energy\". The patient has occasional palpitations and dizziness.  Patient denies chest pain, shortness of breath, dyspnea on exertion, orthopnea, paroxysmal nocturnal dyspnea, syncope, weight gain or leg swelling. 1. Preoperative cardiac risk assessment  2. Abnormal EKG  3. Dizziness  4. Palpitations   5. Mitral regurgitation  6. Tricuspid regurgitation      Has h/o MVP, valvular heart disease   Reports some fatigue   Murmur noticed   EKG reviewed    Check echocardiogram   Has h/o palpitations, dizziness   Symptoms are concerning for possible underlying rhythm problems, EKG was reviewed. Will proceed with a 48 Holter monitor    Has adequate functional capacity, >4 METS   Denies chest pains   No indication for ischemic work up at this time       Above findings and plan of care were discussed with patient in details, patient's questions were answered.      Disposition:  RTC in 12 months             Electronically signed by Adriana Watts MD, [facility]    3/12/2021 at 10:05 AM EST

## 2021-03-23 ENCOUNTER — HOSPITAL ENCOUNTER (OUTPATIENT)
Dept: NON INVASIVE DIAGNOSTICS | Age: 66
Discharge: HOME | End: 2021-03-23
Payer: MEDICARE

## 2021-03-23 ENCOUNTER — HOSPITAL ENCOUNTER (OUTPATIENT)
Age: 66
Discharge: HOME | End: 2021-03-23
Payer: MEDICARE

## 2021-03-23 DIAGNOSIS — R42 DIZZINESS: ICD-10-CM

## 2021-03-23 DIAGNOSIS — R00.2 PALPITATION: ICD-10-CM

## 2021-03-23 DIAGNOSIS — R94.31 ABNORMAL EKG: ICD-10-CM

## 2021-03-23 LAB
APTT: 30.8 SECONDS (ref 22–38)
BUN BLDV-MCNC: 15 MG/DL (ref 7–22)
ERYTHROCYTE [DISTWIDTH] IN BLOOD BY AUTOMATED COUNT: 13.4 % (ref 11.5–14.5)
ERYTHROCYTE [DISTWIDTH] IN BLOOD BY AUTOMATED COUNT: 46.5 FL (ref 35–45)
HCT VFR BLD CALC: 41.9 % (ref 37–47)
HEMOGLOBIN: 13.5 GM/DL (ref 12–16)
INR BLD: 0.96 (ref 0.85–1.13)
LV EF: 58 %
LVEF MODALITY: NORMAL
MCH RBC QN AUTO: 30.3 PG (ref 26–33)
MCHC RBC AUTO-ENTMCNC: 32.2 GM/DL (ref 32.2–35.5)
MCV RBC AUTO: 94.2 FL (ref 81–99)
PLATELET # BLD: 183 THOU/MM3 (ref 130–400)
PMV BLD AUTO: 12.1 FL (ref 9.4–12.4)
RBC # BLD: 4.45 MILL/MM3 (ref 4.2–5.4)
WBC # BLD: 4.5 THOU/MM3 (ref 4.8–10.8)

## 2021-03-23 PROCEDURE — 85027 COMPLETE CBC AUTOMATED: CPT

## 2021-03-23 PROCEDURE — 85730 THROMBOPLASTIN TIME PARTIAL: CPT

## 2021-03-23 PROCEDURE — 85610 PROTHROMBIN TIME: CPT

## 2021-03-23 PROCEDURE — 93306 TTE W/DOPPLER COMPLETE: CPT

## 2021-03-23 PROCEDURE — 84520 ASSAY OF UREA NITROGEN: CPT

## 2021-03-23 PROCEDURE — 93226 XTRNL ECG REC<48 HR SCAN A/R: CPT

## 2021-03-23 PROCEDURE — 93225 XTRNL ECG REC<48 HRS REC: CPT

## 2021-03-23 PROCEDURE — 36415 COLL VENOUS BLD VENIPUNCTURE: CPT

## 2021-03-24 NOTE — PROCEDURES
The skin was prepped and a holter monitor was applied. The patient was instructed on the documentation of symptoms and the purpose of the holter as well as the things to avoid while wearing the holter. The patient was instructed to remove and return the holter on 3/25/2021. The serial number of the holter that was applied is [id number].

## 2021-03-29 LAB
ACQUISITION DURATION: NORMAL S
AVERAGE HEART RATE: 73 BPM
HOOKUP DATE: NORMAL
HOOKUP TIME: NORMAL
MAX HEART RATE TIME/DATE: NORMAL
MAX HEART RATE: 124 BPM
MIN HEART RATE TIME/DATE: NORMAL
MIN HEART RATE: 55 BPM
NUMBER OF QRS COMPLEXES: NORMAL
NUMBER OF SUPRAVENTRICULAR COUPLETS: 0
NUMBER OF SUPRAVENTRICULAR ECTOPICS: 25
NUMBER OF SUPRAVENTRICULAR ISOLATED BEATS: 25
NUMBER OF VENTRICULAR BIGEMINAL CYCLES: 0
NUMBER OF VENTRICULAR COUPLETS: 0
NUMBER OF VENTRICULAR ECTOPICS: 0

## 2021-04-02 NOTE — PROGRESS NOTES
NPO after midnight except sip of water with heart/BP meds  Follow all instructions given by surgeon including medications to hold  Bring insurance card and photo ID  Shower the night before or morning of procedure with liquid antibacterial soap  Wear comfortable clothing  Do not bring jewelry or valuables  Bring list of medications with dosage and how often taken if not reviewed   needed at discharge at least 25years old  Call PAT at [phone] for questions    Instructed to call surgery center at [phone] upon arrival to speak with  before entering building. [facility] 6 to 7 days before procedure.  Pt plans to have completed on 4-5 at [facility]          Covid screening questionnaire complete and negative for symptoms or exposure see chart for documentation

## 2021-04-02 NOTE — PROGRESS NOTES
EKG clearance form given to Dr. Vania Olivera for review. OK to proceed with surgery as planned at surgery center.

## 2021-04-05 ENCOUNTER — HOSPITAL ENCOUNTER (OUTPATIENT)
Age: 66
Discharge: HOME | End: 2021-04-05
Payer: MEDICARE

## 2021-04-05 PROCEDURE — 87636 SARSCOV2 & INF A&B AMP PRB: CPT

## 2021-04-06 LAB
INFLUENZA A: NOT DETECTED
INFLUENZA B: NOT DETECTED
SARS-COV-2 RNA, RT PCR: NOT DETECTED

## 2021-04-09 RX ORDER — SODIUM CHLORIDE, SODIUM LACTATE, POTASSIUM CHLORIDE, CALCIUM CHLORIDE 600; 310; 30; 20 MG/100ML; MG/100ML; MG/100ML; MG/100ML
INJECTION, SOLUTION INTRAVENOUS CONTINUOUS
Status: CANCELLED | OUTPATIENT
Start: 2021-04-09

## 2021-04-09 RX ORDER — ONDANSETRON 2 MG/ML
8 INJECTION INTRAMUSCULAR; INTRAVENOUS EVERY 8 HOURS PRN
Status: CANCELLED | OUTPATIENT
Start: 2021-04-09

## 2021-04-09 NOTE — H&P
[facility].      Patient Name: Mateo Ro   Patient ID: [id number]   Sex: Female   YOB: 1955         Visit Date: 2021    Provider: Nj Rothman. Gisele Hahn DO   Location: [address] [place] [address] Office   Location Address: [address] [facility] [address] [facility] , [place]  [id number]   Location Phone: [phone]          Chief Complaint   Chronic pelvic pain       History Of Present Illness   The patient presents for a Dx laparoscopy with LSO for LLQ pain, Lt ov cyst.   The risks of the procedure were reviewed including infection, hemorrhage, and injury to bladder or bowel or ureter. Alternatives to the procedure were also discussed including doing nothing and medical treatments. The procedure was reviewed in detail as well as what to expect postoperatively. All the patient's questions were answered and she demonstrated an understanding of our discussion.    The patient is a 73year old /White female, ,whose LMP began on 2008 and now presents with a history of pelvic pain. Symptom History:The pelvic pain began gradually 1 year ago. The pain is located in the left-lower quadrant and it does not radiate. She states the severity of pain is 9/10 on the pain scale and it has a shooting and a constant quality. Past Medical History:The patient's past medical history is as noted on the facesheet: Abdominal Pain, LLQ, Age-related osteoporosis without current pathological fracture, Atrophic vaginitis, Bloating, Cyst of left ovary, Family history of osteoporosis, Fibrocystic breast disease, Hernia, Osteopenia, Osteoporosis, Other persons seeking consultation; other counseling, not elsewhere classified; other specified counseling, Pelvic pressure in female, Postmenopausal, Ulceration of vulva in diseases classified elsewhere, Urge incontinence, and Urinary frequency. The aggravating factors are identified as postural changes and standing.  The alleviating factors have been identified as rest.   She has no additional complaints. She specifically denies anorexia, chills, constipation, diarrhea, discharge, dysuria, and fever.          Past Medical History   Disease Name Date Onset Notes   Abdominal Pain, LLQ 02/22/2021 --    Age-related osteoporosis without current pathological fracture 02/22/2021 --    Atrophic vaginitis 10/03/2014    Bloating 02/22/2021 --    Cyst of left ovary 02/22/2021 --    Family history of osteoporosis 03/20/2015 grandfather   Fibrocystic breast disease 03/20/2015    Hernia 1/2021 inguinal    Osteopenia 03/05/2015    Osteoporosis --  --    Other persons seeking consultation; other counseling, not elsewhere classified; other specified counseling 10/11/2013    Pelvic pressure in female 02/22/2021 --    Postmenopausal 03/05/2015    Ulceration of vulva in diseases classified elsewhere --  --    Urge incontinence --  --    Urinary frequency 02/22/2021 --            Past Surgical History   Procedure Name Date Notes   D&C --  --    Echocardiography --  normal per pt            Medication List   Name Date Started Instructions   Imitrex 05/29/2015 --    Tylenol PM 05/29/2015 --            Allergy List   Allergen Name Date Reaction Notes   No known environmental allergies Sep 27 2013 12:00AM --  No known environmental allergies   No known food allergies Sep 27 2013 12:00AM --  NO KNOWN FOOD ALLERGIES   Other Sep 27 2013 12:00AM Sansert cardiac arrest Sansert - cardiac arrest           Review of Systems   ConstitutionalDenies : body aches, additional symptoms except as noted in the HPI   BreastsDenies : additional symptoms except as noted in the HPI   CardiovascularDenies : chest pain, syncope, additional symptoms except as noted in the HPI   RespiratoryDenies : shortness of breath, wheezing, additional symptoms except as noted in the HPI   GastrointestinalDenies : diarrhea, blood in stools, additional symptoms except as noted in the HPI   GenitourinaryDenies : additional symptoms except as noted in the HPI   EndocrineDenies : cold intolerance, heat intolerance, additional symptoms except as noted in the HPI   PsychiatricDenies : addtional symptoms except as noted in the HPI   Heme-LymphDenies : easy bruising, lymph node enlargement or tenderness, addtional symptoms except as noted in the HPI       Vitals   Date Time BP Position Site L\R Cuff Size HR RR TEMP (F) WT  HT  BMI kg/m2 BSA m2 O2 Sat FR L/min FiO2 HC       04/08/2021 12:26 /70 Sitting       231lbs 16oz 5'  6\" 37.45 2.21               Physical Examination   ConstitutionalAppearance : obese, well developed, alert, in no acute distress   EyesConjunctiva/Eyelids : conjunctiva normal, eyelid appearance normal   Sclera : sclera white   HENTHead and Face :   Head : normocephalic, atraumatic   ChestRespiratory Effort : breathing unlabored   CardiovascularPeripheral Vascular System :   Peripheral Circulation : no edema, no cyanosis   LymphaticLymph Nodes : no other lymphadenopathy present   Neurologic/PsychiatricMental Status :   Orientation : grossly oriented to person, place and time   Mood and Affect : mood normal, affect appropriate           Assessment   Chronic Left Pelvic pain in female     625.9/R10.2    Cyst of left ovary     620.2/N83.202          Plan   Diagnostic Laparoscopy with LSO            Electronically Signed by: Alli Dean  Gray Grace on April 8, 2021 12:57:12 PM

## 2021-04-12 ENCOUNTER — HOSPITAL ENCOUNTER (OUTPATIENT)
Age: 66
Setting detail: OUTPATIENT SURGERY
Discharge: HOME | End: 2021-04-12
Attending: OBSTETRICS & GYNECOLOGY | Admitting: OBSTETRICS & GYNECOLOGY
Payer: MEDICARE

## 2021-04-12 ENCOUNTER — ANESTHESIA EVENT (OUTPATIENT)
Dept: OPERATING ROOM | Age: 66
End: 2021-04-12
Payer: MEDICARE

## 2021-04-12 ENCOUNTER — ANESTHESIA (OUTPATIENT)
Dept: OPERATING ROOM | Age: 66
End: 2021-04-12
Payer: MEDICARE

## 2021-04-12 VITALS
RESPIRATION RATE: 11 BRPM | SYSTOLIC BLOOD PRESSURE: 103 MMHG | DIASTOLIC BLOOD PRESSURE: 59 MMHG | OXYGEN SATURATION: 100 % | TEMPERATURE: 96.8 F

## 2021-04-12 VITALS
RESPIRATION RATE: 13 BRPM | DIASTOLIC BLOOD PRESSURE: 56 MMHG | OXYGEN SATURATION: 93 % | BODY MASS INDEX: 36.07 KG/M2 | HEART RATE: 69 BPM | HEIGHT: 67 IN | WEIGHT: 229.8 LBS | SYSTOLIC BLOOD PRESSURE: 115 MMHG | TEMPERATURE: 97.5 F

## 2021-04-12 DIAGNOSIS — G89.18 POST-OP PAIN: Primary | ICD-10-CM

## 2021-04-12 PROCEDURE — 2709999900 HC NON-CHARGEABLE SUPPLY: Performed by: OBSTETRICS & GYNECOLOGY

## 2021-04-12 PROCEDURE — 3600000014 HC SURGERY LEVEL 4 ADDTL 15MIN: Performed by: OBSTETRICS & GYNECOLOGY

## 2021-04-12 PROCEDURE — 6360000002 HC RX W HCPCS: Performed by: ANESTHESIOLOGY

## 2021-04-12 PROCEDURE — 7100000011 HC PHASE II RECOVERY - ADDTL 15 MIN: Performed by: OBSTETRICS & GYNECOLOGY

## 2021-04-12 PROCEDURE — 2720000010 HC SURG SUPPLY STERILE: Performed by: OBSTETRICS & GYNECOLOGY

## 2021-04-12 PROCEDURE — 7100000001 HC PACU RECOVERY - ADDTL 15 MIN: Performed by: OBSTETRICS & GYNECOLOGY

## 2021-04-12 PROCEDURE — 3700000000 HC ANESTHESIA ATTENDED CARE: Performed by: OBSTETRICS & GYNECOLOGY

## 2021-04-12 PROCEDURE — 3600000004 HC SURGERY LEVEL 4 BASE: Performed by: OBSTETRICS & GYNECOLOGY

## 2021-04-12 PROCEDURE — 3700000001 HC ADD 15 MINUTES (ANESTHESIA): Performed by: OBSTETRICS & GYNECOLOGY

## 2021-04-12 PROCEDURE — 2500000003 HC RX 250 WO HCPCS: Performed by: NURSE ANESTHETIST, CERTIFIED REGISTERED

## 2021-04-12 PROCEDURE — 6360000002 HC RX W HCPCS: Performed by: NURSE ANESTHETIST, CERTIFIED REGISTERED

## 2021-04-12 PROCEDURE — 7100000010 HC PHASE II RECOVERY - FIRST 15 MIN: Performed by: OBSTETRICS & GYNECOLOGY

## 2021-04-12 PROCEDURE — 6370000000 HC RX 637 (ALT 250 FOR IP): Performed by: OBSTETRICS & GYNECOLOGY

## 2021-04-12 PROCEDURE — 88305 TISSUE EXAM BY PATHOLOGIST: CPT

## 2021-04-12 PROCEDURE — C1758 CATHETER, URETERAL: HCPCS | Performed by: OBSTETRICS & GYNECOLOGY

## 2021-04-12 PROCEDURE — 2580000003 HC RX 258: Performed by: NURSE ANESTHETIST, CERTIFIED REGISTERED

## 2021-04-12 PROCEDURE — 7100000000 HC PACU RECOVERY - FIRST 15 MIN: Performed by: OBSTETRICS & GYNECOLOGY

## 2021-04-12 RX ORDER — KETOROLAC TROMETHAMINE 30 MG/ML
INJECTION, SOLUTION INTRAMUSCULAR; INTRAVENOUS
Status: DISCONTINUED
Start: 2021-04-12 | End: 2021-04-12 | Stop reason: HOSPADM

## 2021-04-12 RX ORDER — KETOROLAC TROMETHAMINE 30 MG/ML
30 INJECTION, SOLUTION INTRAMUSCULAR; INTRAVENOUS EVERY 6 HOURS
Status: DISCONTINUED | OUTPATIENT
Start: 2021-04-12 | End: 2021-04-12 | Stop reason: HOSPADM

## 2021-04-12 RX ORDER — MORPHINE SULFATE 2 MG/ML
4 INJECTION, SOLUTION INTRAMUSCULAR; INTRAVENOUS
Status: DISCONTINUED | OUTPATIENT
Start: 2021-04-12 | End: 2021-04-12 | Stop reason: HOSPADM

## 2021-04-12 RX ORDER — NEOSTIGMINE METHYLSULFATE 5 MG/5 ML
SYRINGE (ML) INTRAVENOUS PRN
Status: DISCONTINUED | OUTPATIENT
Start: 2021-04-12 | End: 2021-04-12 | Stop reason: SDUPTHER

## 2021-04-12 RX ORDER — ONDANSETRON 2 MG/ML
4 INJECTION INTRAMUSCULAR; INTRAVENOUS
Status: DISCONTINUED | OUTPATIENT
Start: 2021-04-12 | End: 2021-04-12 | Stop reason: HOSPADM

## 2021-04-12 RX ORDER — DIPHENHYDRAMINE HYDROCHLORIDE 50 MG/ML
12.5 INJECTION INTRAMUSCULAR; INTRAVENOUS
Status: DISCONTINUED | OUTPATIENT
Start: 2021-04-12 | End: 2021-04-12 | Stop reason: HOSPADM

## 2021-04-12 RX ORDER — PROPOFOL 10 MG/ML
INJECTION, EMULSION INTRAVENOUS PRN
Status: DISCONTINUED | OUTPATIENT
Start: 2021-04-12 | End: 2021-04-12 | Stop reason: SDUPTHER

## 2021-04-12 RX ORDER — SODIUM CHLORIDE 9 MG/ML
INJECTION, SOLUTION INTRAVENOUS CONTINUOUS PRN
Status: DISCONTINUED | OUTPATIENT
Start: 2021-04-12 | End: 2021-04-12 | Stop reason: SDUPTHER

## 2021-04-12 RX ORDER — FENTANYL CITRATE 50 UG/ML
50 INJECTION, SOLUTION INTRAMUSCULAR; INTRAVENOUS EVERY 5 MIN PRN
Status: DISCONTINUED | OUTPATIENT
Start: 2021-04-12 | End: 2021-04-12 | Stop reason: HOSPADM

## 2021-04-12 RX ORDER — GLYCOPYRROLATE 1 MG/5 ML
SYRINGE (ML) INTRAVENOUS PRN
Status: DISCONTINUED | OUTPATIENT
Start: 2021-04-12 | End: 2021-04-12 | Stop reason: SDUPTHER

## 2021-04-12 RX ORDER — ONDANSETRON 2 MG/ML
INJECTION INTRAMUSCULAR; INTRAVENOUS PRN
Status: DISCONTINUED | OUTPATIENT
Start: 2021-04-12 | End: 2021-04-12 | Stop reason: SDUPTHER

## 2021-04-12 RX ORDER — MEPERIDINE HYDROCHLORIDE 25 MG/ML
12.5 INJECTION INTRAMUSCULAR; INTRAVENOUS; SUBCUTANEOUS EVERY 5 MIN PRN
Status: DISCONTINUED | OUTPATIENT
Start: 2021-04-12 | End: 2021-04-12 | Stop reason: HOSPADM

## 2021-04-12 RX ORDER — HYDROCODONE BITARTRATE AND ACETAMINOPHEN 5; 325 MG/1; MG/1
2 TABLET ORAL EVERY 4 HOURS PRN
Status: DISCONTINUED | OUTPATIENT
Start: 2021-04-12 | End: 2021-04-12 | Stop reason: HOSPADM

## 2021-04-12 RX ORDER — HYDROCODONE BITARTRATE AND ACETAMINOPHEN 5; 325 MG/1; MG/1
1 TABLET ORAL EVERY 4 HOURS PRN
Status: DISCONTINUED | OUTPATIENT
Start: 2021-04-12 | End: 2021-04-12 | Stop reason: HOSPADM

## 2021-04-12 RX ORDER — LABETALOL 20 MG/4 ML (5 MG/ML) INTRAVENOUS SYRINGE
5 4 TIMES DAILY PRN
Status: DISCONTINUED | OUTPATIENT
Start: 2021-04-12 | End: 2021-04-12 | Stop reason: HOSPADM

## 2021-04-12 RX ORDER — HYDROCODONE BITARTRATE AND ACETAMINOPHEN 5; 325 MG/1; MG/1
1 TABLET ORAL EVERY 6 HOURS PRN
Qty: 6 TABLET | Refills: 0 | Status: SHIPPED | OUTPATIENT
Start: 2021-04-12 | End: 2021-04-15

## 2021-04-12 RX ORDER — ROCURONIUM BROMIDE 10 MG/ML
INJECTION, SOLUTION INTRAVENOUS PRN
Status: DISCONTINUED | OUTPATIENT
Start: 2021-04-12 | End: 2021-04-12 | Stop reason: SDUPTHER

## 2021-04-12 RX ORDER — FENTANYL CITRATE 50 UG/ML
INJECTION, SOLUTION INTRAMUSCULAR; INTRAVENOUS PRN
Status: DISCONTINUED | OUTPATIENT
Start: 2021-04-12 | End: 2021-04-12 | Stop reason: SDUPTHER

## 2021-04-12 RX ORDER — SODIUM CHLORIDE, SODIUM LACTATE, POTASSIUM CHLORIDE, CALCIUM CHLORIDE 600; 310; 30; 20 MG/100ML; MG/100ML; MG/100ML; MG/100ML
INJECTION, SOLUTION INTRAVENOUS SEE ADMIN INSTRUCTIONS
Status: DISCONTINUED | OUTPATIENT
Start: 2021-04-12 | End: 2021-04-12 | Stop reason: HOSPADM

## 2021-04-12 RX ORDER — ACETAMINOPHEN 325 MG/1
650 TABLET ORAL EVERY 4 HOURS PRN
Status: DISCONTINUED | OUTPATIENT
Start: 2021-04-12 | End: 2021-04-12 | Stop reason: HOSPADM

## 2021-04-12 RX ORDER — DEXAMETHASONE SODIUM PHOSPHATE 10 MG/ML
INJECTION, EMULSION INTRAMUSCULAR; INTRAVENOUS PRN
Status: DISCONTINUED | OUTPATIENT
Start: 2021-04-12 | End: 2021-04-12 | Stop reason: SDUPTHER

## 2021-04-12 RX ORDER — MORPHINE SULFATE 2 MG/ML
2 INJECTION, SOLUTION INTRAMUSCULAR; INTRAVENOUS EVERY 5 MIN PRN
Status: DISCONTINUED | OUTPATIENT
Start: 2021-04-12 | End: 2021-04-12 | Stop reason: HOSPADM

## 2021-04-12 RX ORDER — KETOROLAC TROMETHAMINE 30 MG/ML
INJECTION, SOLUTION INTRAMUSCULAR; INTRAVENOUS PRN
Status: DISCONTINUED | OUTPATIENT
Start: 2021-04-12 | End: 2021-04-12 | Stop reason: SDUPTHER

## 2021-04-12 RX ORDER — HYDRALAZINE HYDROCHLORIDE 20 MG/ML
5 INJECTION INTRAMUSCULAR; INTRAVENOUS EVERY 10 MIN PRN
Status: DISCONTINUED | OUTPATIENT
Start: 2021-04-12 | End: 2021-04-12 | Stop reason: HOSPADM

## 2021-04-12 RX ORDER — MORPHINE SULFATE 2 MG/ML
2 INJECTION, SOLUTION INTRAMUSCULAR; INTRAVENOUS
Status: DISCONTINUED | OUTPATIENT
Start: 2021-04-12 | End: 2021-04-12 | Stop reason: HOSPADM

## 2021-04-12 RX ADMIN — ONDANSETRON HYDROCHLORIDE 4 MG: 4 INJECTION, SOLUTION INTRAMUSCULAR; INTRAVENOUS at 12:47

## 2021-04-12 RX ADMIN — KETOROLAC TROMETHAMINE 30 MG: 30 INJECTION, SOLUTION INTRAMUSCULAR at 12:57

## 2021-04-12 RX ADMIN — Medication 4 MG: at 12:47

## 2021-04-12 RX ADMIN — FENTANYL CITRATE 25 MCG: 50 INJECTION, SOLUTION INTRAMUSCULAR; INTRAVENOUS at 12:24

## 2021-04-12 RX ADMIN — SODIUM CHLORIDE: 9 INJECTION, SOLUTION INTRAVENOUS at 12:04

## 2021-04-12 RX ADMIN — HYDROCODONE BITARTRATE AND ACETAMINOPHEN 1 TABLET: 5; 325 TABLET ORAL at 13:51

## 2021-04-12 RX ADMIN — FENTANYL CITRATE 50 MCG: 50 INJECTION, SOLUTION INTRAMUSCULAR; INTRAVENOUS at 12:06

## 2021-04-12 RX ADMIN — Medication 0.8 MG: at 12:47

## 2021-04-12 RX ADMIN — DEXAMETHASONE SODIUM PHOSPHATE 10 MG: 10 INJECTION, EMULSION INTRAMUSCULAR; INTRAVENOUS at 12:12

## 2021-04-12 RX ADMIN — ROCURONIUM BROMIDE 35 MG: 10 INJECTION INTRAVENOUS at 12:06

## 2021-04-12 RX ADMIN — FENTANYL CITRATE 50 MCG: 50 INJECTION, SOLUTION INTRAMUSCULAR; INTRAVENOUS at 13:20

## 2021-04-12 RX ADMIN — PROPOFOL 170 MG: 10 INJECTION, EMULSION INTRAVENOUS at 12:06

## 2021-04-12 RX ADMIN — FENTANYL CITRATE 25 MCG: 50 INJECTION, SOLUTION INTRAMUSCULAR; INTRAVENOUS at 12:34

## 2021-04-12 ASSESSMENT — PULMONARY FUNCTION TESTS
PIF_VALUE: 3
PIF_VALUE: 18
PIF_VALUE: 29
PIF_VALUE: 17
PIF_VALUE: 29
PIF_VALUE: 5
PIF_VALUE: 30
PIF_VALUE: 18
PIF_VALUE: 17
PIF_VALUE: 1
PIF_VALUE: 14
PIF_VALUE: 18
PIF_VALUE: 18
PIF_VALUE: 21
PIF_VALUE: 3
PIF_VALUE: 29
PIF_VALUE: 3
PIF_VALUE: 23
PIF_VALUE: 17
PIF_VALUE: 18
PIF_VALUE: 30
PIF_VALUE: 9
PIF_VALUE: 18
PIF_VALUE: 18
PIF_VALUE: 1
PIF_VALUE: 1
PIF_VALUE: 17
PIF_VALUE: 5
PIF_VALUE: 29
PIF_VALUE: 29
PIF_VALUE: 18
PIF_VALUE: 28
PIF_VALUE: 29
PIF_VALUE: 30

## 2021-04-12 ASSESSMENT — PAIN - FUNCTIONAL ASSESSMENT: PAIN_FUNCTIONAL_ASSESSMENT: 0-10

## 2021-04-12 NOTE — OP NOTE
[address]                                    [place], [place]                                   RECORD OF OPERATION       PREOPERATIVE DIAGNOSES:  LLQ pain, Left ovarian cyst     POSTOPERATIVE DIAGNOSES:  Left tubal cyst     PROCEDURE:     Diagnostic laparoscopy, LSO     SURGEON:  Dr. Thi Serna. Emy Stands. ANESTHESIA:  General.     ESTIMATED BLOOD LOSS:  5 ml. COMPLICATIONS:  None. SPECIMENS:  Left tube and ovary with tubal cyst attached     FINDINGS:   Normal uterus, right tube and bilateral ovaries, left tube with a 2cm cyst attached with clear appearing fluid. Omental adhesions to RLQ near cecum. PROCEDURE:  After signing informed consent, the patient was taken to the Operating Room and placed in a supine position and given general anesthesia. The patient was then placed in a dorsolithotomy position and prepped and draped in the normal fashion. Two Wells retractors were used in the vagina to identify the cervix. The anterior lip of the cervix was grasped with a single-tooth  tenaculum. A cervical dilator was placed in the endocervical canal for manipulation of the uterus. All other instruments were removed from the vagina. Attention was placed to the abdomen. A 5 mm incision was made in the umbilicus in the midline position. A 5 mm trocar was placed under direct visualization. The abdomen was inflated with CO2 gas. A left lower quadrant 5 mm port was placed under direct visualization. A Ligasure was used to clamp, cauterize, and cut the infundibulopelvic and uteroovarian ligaments to remove the left tube and ovary with the tubal cyst intact. The umbilical port was switched out for an 11 mm port. An endocatch bag was used to remove the left tube and ovary with cyst intact. The umbilical port was removed with removal of endocatch bag. The fascia was then closed on the umbilical port with 0-Vicryl suture with direct visualization.   Good hemostasis was noted on this port and left adnexa. All instrument were removed from the abdomen. All skin incisions were closed with 4-0 Vicryl, Mastisol and Steri-Strips. Counts were correct x2. The patient was transferred to Recovery in stable condition. Jeny Castro.

## 2021-04-12 NOTE — INTERVAL H&P NOTE
[address] [address]  History and Physical Update    Pt Name: Nandini Ivory  MRN: [id number]  YOB: 1955  Date of evaluation: 4/12/2021    [x] I have examined the patient and reviewed the H&P/Consult and there are no changes to the patient or plans.     [] I have examined the patient and reviewed the H&P/Consult and have noted the following changes:        Arabella Boyd  Electronically signed 4/12/2021 at 11:59 AM

## 2021-04-12 NOTE — ANESTHESIA POSTPROCEDURE EVALUATION
Department of Anesthesiology  Postprocedure Note    Patient: Lashaun Mims  MRN: [id number]  YOB: 1955  Date of evaluation: 4/12/2021  Time:  2:48 PM     Procedure Summary     Date: 04/12/21 Room / Location: [facility] 02 / [facility]    Anesthesia Start: 4763 Anesthesia Stop: 1516    Procedure: LAPAROSCOPY EXPLORATORY, LEFT SALPINGO- OOPHORECTOMY, AND TUBAL CYSTECTOMY (Left ) Diagnosis: (LEFT LOWER QUAD PAIN, LEFT OVARIAN CYST)    Surgeons: Mey Diaz DO Responsible Provider: Chinyere Barker MD    Anesthesia Type: general ASA Status: 2          Anesthesia Type: general    Bernarda Phase I: Bernarda Score: 10    Bernarda Phase II: Bernarda Score: 10    Last vitals: Reviewed and per EMR flowsheets.        Anesthesia Post Evaluation    Patient location during evaluation: PACU  Level of consciousness: awake  Complications: no  Cardiovascular status: hemodynamically stable  Respiratory status: acceptable

## 2021-04-12 NOTE — ANESTHESIA PRE PROCEDURE
Department of Anesthesiology  Preprocedure Note       Name:  Terrell Habermann   Age:  73 y.o.  :  1955                                          MRN:  [id number]         Date:  2021      Surgeon: [facility]dom):  Shante Mtz DO    Procedure: Procedure(s):  LAPAROSCOPY EXPLORATORY, LEFT SALPINGO OOPHORECTOMY    Medications prior to admission:   Prior to Admission medications    Medication Sig Start Date End Date Taking? Authorizing Provider   ibuprofen (ADVIL;MOTRIN) 200 MG tablet Take 200 mg by mouth every 6 hours as needed for Pain   Yes Historical Provider, MD   acetaminophen (TYLENOL) 500 MG tablet Take 500 mg by mouth every 6 hours as needed for Pain   Yes Historical Provider, MD   SUMAtriptan (IMITREX) 100 MG tablet TAKE 1 AT THE ONSET OF HEADACHE MAY REPEAT IN 2 HOURS X 1 IF NEEDED. 21  Yes Edilberto Maxwell MD       Current medications:    No current facility-administered medications for this encounter. Allergies:     Allergies   Allergen Reactions    Sansert      Cardiac arrest       Problem List:    Patient Active Problem List   Diagnosis Code    Migraine G43.909    Mitral valve prolapse I34.1    Class 2 obesity without serious comorbidity with body mass index (BMI) of 37.0 to 37.9 in adult E66.9, Z68.37       Past Medical History:        Diagnosis Date    Migraine headache     Mitral valve prolapse        Past Surgical History:        Procedure Laterality Date    DILATION AND CURETTAGE OF UTERUS      x 2       Social History:    Social History     Tobacco Use    Smoking status: Never Smoker    Smokeless tobacco: Never Used   Substance Use Topics    Alcohol use: Yes     Comment: rarely                                Counseling given: Not Answered      Vital Signs (Current):   Vitals:    21 1151 21 1123   BP:  (!) 146/72   Pulse:  74   Resp:  16   Temp:  97.4 °F (36.3 °C)   TempSrc:  Temporal   SpO2:  98%   Weight: 225 lb (102.1 kg) 229 lb 12.8 oz (104.2 kg) Height: 5' 7\" (1.702 m) 5' 7\" (1.702 m)                                              BP Readings from Last 3 Encounters:   04/12/21 (!) 146/72   03/12/21 (!) 152/67   01/11/21 112/84       NPO Status: Time of last liquid consumption: 2100                        Time of last solid consumption: 2100                        Date of last liquid consumption: 04/11/21                        Date of last solid food consumption: 04/11/21    BMI:   Wt Readings from Last 3 Encounters:   04/12/21 229 lb 12.8 oz (104.2 kg)   03/12/21 231 lb (104.8 kg)   01/11/21 230 lb (104.3 kg)     Body mass index is 35.99 kg/m². CBC:   Lab Results   Component Value Date    WBC 4.5 03/23/2021    RBC 4.45 03/23/2021    RBC 4.47 03/14/2018    HGB 13.5 03/23/2021    HCT 41.9 03/23/2021    MCV 94.2 03/23/2021    RDW 13.3 03/14/2018     03/23/2021       CMP:   Lab Results   Component Value Date     01/15/2021    K 4.1 01/15/2021     01/15/2021    CO2 27 01/15/2021    BUN 15 03/23/2021    CREATININE 0.7 01/15/2021    LABGLOM 84 01/15/2021    GLUCOSE 83 01/15/2021    GLUCOSE 87 03/14/2018    PROT 7.0 01/15/2021    CALCIUM 9.1 01/15/2021    BILITOT 0.4 01/15/2021    ALKPHOS 73 01/15/2021    AST 15 01/15/2021    ALT 11 01/15/2021       POC Tests: No results for input(s): POCGLU, POCNA, POCK, POCCL, POCBUN, POCHEMO, POCHCT in the last 72 hours.     Coags:   Lab Results   Component Value Date    INR 0.96 03/23/2021    APTT 30.8 03/23/2021       HCG (If Applicable): No results found for: PREGTESTUR, PREGSERUM, HCG, HCGQUANT     ABGs: No results found for: PHART, PO2ART, ZDG6GGH, WYV9VXG, BEART, X0UPVJWA     Type & Screen (If Applicable):  No results found for: LABABO, LABRH    Drug/Infectious Status (If Applicable):  No results found for: HIV, HEPCAB    COVID-19 Screening (If Applicable):   Lab Results   Component Value Date    COVID19 NOT DETECTED 04/05/2021           Anesthesia Evaluation    Airway: Mallampati: II       Mouth opening: > = 3 FB Dental:          Pulmonary:       (-) COPD                           Cardiovascular:        (-) hypertension                Neuro/Psych:               GI/Hepatic/Renal:             Endo/Other:                     Abdominal:           Vascular:                                        Anesthesia Plan      general     ASA 2       Induction: intravenous. Anesthetic plan and risks discussed with patient. Plan discussed with CRNA.                   Varinder Zapata MD   4/12/2021

## 2021-04-12 NOTE — DISCHARGE SUMMARY
Gynecology Discharge Summary        Pt Name: Chuck Rodrigues  MRN: [id number] Sheron #: [de-identified]  YOB: 1955      Reasons for Admission on 4/12/2021 10:54 AM  LEFT LOWER QUAD PAIN, LEFT OVARIAN CYST  No comment available      PROCEDURE:  Diagnostic laparoscopy, LSO    Operative Complications   none    Pre Operative Labs    Lab Results   Component Value Date    WBC 4.5 03/23/2021    HGB 13.5 03/23/2021    HCT 41.9 03/23/2021     03/23/2021       Discharge Diagnosis   Left tubal cyst, S/P Laparoscopic LSO    Discharge Information  Current Discharge Medication List      START taking these medications    Details   HYDROcodone-acetaminophen (NORCO) 5-325 MG per tablet Take 1 tablet by mouth every 6 hours as needed for Pain for up to 3 days. Intended supply: 3 days. Take lowest dose possible to manage pain  Qty: 6 tablet, Refills: 0    Comments: Reduce doses taken as pain becomes manageable  Associated Diagnoses: Post-op pain         CONTINUE these medications which have NOT CHANGED    Details   ibuprofen (ADVIL;MOTRIN) 200 MG tablet Take 200 mg by mouth every 6 hours as needed for Pain      acetaminophen (TYLENOL) 500 MG tablet Take 500 mg by mouth every 6 hours as needed for Pain      SUMAtriptan (IMITREX) 100 MG tablet TAKE 1 AT THE ONSET OF HEADACHE MAY REPEAT IN 2 HOURS X 1 IF NEEDED.   Qty: 9 tablet, Refills: 11    Associated Diagnoses: Other migraine without status migrainosus, not intractable               Diet regular  Condition: Stable  Discharge to:  home  Follow up in 1-2 wks    Patient to be discharged on 4/12/2021

## 2021-04-12 NOTE — PROGRESS NOTES
1255-  Patient arrived to pacu via cart to bay [facility]. Spontaneous respirations even and unlabored. Placed on monitor--VSS. Report received from  [address] [facility] Delaneydelmis and Kathy Huerta. 1256-  Assessment completed. Patient is drowsy, but responds to name and follows commands. IV infusing 0.9 in right AC-- no complications. Patient states pain 4/10 in abdomen. Abdomen soft and non-distended. See incision charting. Patient denies N/V--will monitor. Bety-pad in place--clean and dry. 1257-  Patient received 30mg of Toradol IV via anesthesia-- see MAR.   1304-  Respirations even and unlabored. VSS. 1310-  Patient resting. Appears comfortable. 1315-  Respirations even and unlabored. VSS.   1320-  Patient states pain 8/10.  50mcgs of Fentanyl given. See MAR.   1325-  Respirations even and unlabored. VSS.    1330-  Patient states pain 5/10 and tolerable at this time. 1335-  Respirations even and unlabored. VSS.   1340-  Reassessment completed. Patient meets criteria to be moved to phase II.   1345-  Report given to Demarcus Alejandre RN while this RN at lunch. [address] given by [facility]. 1415-  Report received back from [facility].   4414-  IV removed-- no complications. Bandage applied. Patient states pain 2/10 and tolerable. 1430-  Discharge instructions given. Understanding verbalized. 1435-  Patient dressing. 1441-  Patient discharged in stable condition with all belongings. Keke SHELTON walked patient to car.

## 2021-04-15 ENCOUNTER — TELEPHONE (OUTPATIENT)
Dept: FAMILY MEDICINE CLINIC | Age: 66
End: 2021-04-15

## 2021-04-15 NOTE — TELEPHONE ENCOUNTER
Kassie [facility] Transitions Initial Follow Up Call    Call within 2 business days of discharge: Yes     Patient: Ian Bowling Patient : 1955 MRN: [id number]    [unfilled]    RARS: No data recorded     Spoke with: patient     Discharge department/facility: [facility]    Non-face-to-face services provided:  Scheduled appointment with [phone] with Dr Mark Chavez   Date Time Provider Milad Martinez   3/25/2022 10:00 AM August Seth MD [address] [facility] [address] [place] [address] ([address] [place] [address])

## 2021-09-16 ENCOUNTER — TELEPHONE (OUTPATIENT)
Dept: FAMILY MEDICINE CLINIC | Age: 66
End: 2021-09-16

## 2021-09-16 RX ORDER — CETIRIZINE HYDROCHLORIDE, PSEUDOEPHEDRINE HYDROCHLORIDE 5; 120 MG/1; MG/1
1 TABLET, FILM COATED, EXTENDED RELEASE ORAL 2 TIMES DAILY
Qty: 20 TABLET | Refills: 0 | Status: SHIPPED | OUTPATIENT
Start: 2021-09-16 | End: 2021-09-26

## 2021-09-16 NOTE — TELEPHONE ENCOUNTER
Spoke to the pt and she states she went to [address] [place] [address] yesterday and they tested her for strep and COVID and both were negative. C/O still having a very ST with drainage down the back of her throat (feels like it is stuck in there). She states she was unable to sleep due to the drainage. No cough or sinus congestion. No fever. Pt wants to know if there is anything she can take or do for the drainage. She took Mucinex earlier this morning and it hasn't really helped. Please advise. Uses [facility].     Call pt back at [phone]

## 2021-09-16 NOTE — TELEPHONE ENCOUNTER
Rx EP'd to pharmacy. Please notify patient.       Requested Prescriptions     Signed Prescriptions Disp Refills    cetirizine-psuedoephedrine (ZYRTEC-D) 5-120 MG per extended release tablet 20 tablet 0     Sig: Take 1 tablet by mouth 2 times daily for 10 days     Authorizing Provider: Amita Crespo if not better      Electronically signed by Alexander Maldonado MD on 9/16/2021 at 1:02 PM

## 2022-05-24 ENCOUNTER — OFFICE VISIT (OUTPATIENT)
Dept: FAMILY MEDICINE CLINIC | Age: 67
End: 2022-05-24
Payer: MEDICARE

## 2022-05-24 VITALS
DIASTOLIC BLOOD PRESSURE: 70 MMHG | HEIGHT: 67 IN | SYSTOLIC BLOOD PRESSURE: 138 MMHG | WEIGHT: 211.2 LBS | TEMPERATURE: 98.6 F | BODY MASS INDEX: 33.15 KG/M2 | HEART RATE: 92 BPM | RESPIRATION RATE: 16 BRPM

## 2022-05-24 DIAGNOSIS — Z12.31 ENCOUNTER FOR SCREENING MAMMOGRAM FOR MALIGNANT NEOPLASM OF BREAST: ICD-10-CM

## 2022-05-24 DIAGNOSIS — E66.09 CLASS 1 OBESITY DUE TO EXCESS CALORIES WITHOUT SERIOUS COMORBIDITY WITH BODY MASS INDEX (BMI) OF 33.0 TO 33.9 IN ADULT: ICD-10-CM

## 2022-05-24 DIAGNOSIS — F43.21 GRIEF REACTION: ICD-10-CM

## 2022-05-24 DIAGNOSIS — D72.819 CHRONIC LEUKOPENIA: ICD-10-CM

## 2022-05-24 DIAGNOSIS — Z23 NEED FOR PNEUMOCOCCAL VACCINE: ICD-10-CM

## 2022-05-24 DIAGNOSIS — E78.5 DYSLIPIDEMIA: ICD-10-CM

## 2022-05-24 DIAGNOSIS — G89.29 CHRONIC LLQ PAIN: ICD-10-CM

## 2022-05-24 DIAGNOSIS — R10.32 CHRONIC LLQ PAIN: ICD-10-CM

## 2022-05-24 DIAGNOSIS — G43.809 OTHER MIGRAINE WITHOUT STATUS MIGRAINOSUS, NOT INTRACTABLE: Primary | ICD-10-CM

## 2022-05-24 PROBLEM — E66.811 CLASS 1 OBESITY DUE TO EXCESS CALORIES WITHOUT SERIOUS COMORBIDITY WITH BODY MASS INDEX (BMI) OF 33.0 TO 33.9 IN ADULT: Status: ACTIVE | Noted: 2018-03-08

## 2022-05-24 PROCEDURE — G8400 PT W/DXA NO RESULTS DOC: HCPCS | Performed by: FAMILY MEDICINE

## 2022-05-24 PROCEDURE — 3017F COLORECTAL CA SCREEN DOC REV: CPT | Performed by: FAMILY MEDICINE

## 2022-05-24 PROCEDURE — 1123F ACP DISCUSS/DSCN MKR DOCD: CPT | Performed by: FAMILY MEDICINE

## 2022-05-24 PROCEDURE — G8427 DOCREV CUR MEDS BY ELIG CLIN: HCPCS | Performed by: FAMILY MEDICINE

## 2022-05-24 PROCEDURE — 99214 OFFICE O/P EST MOD 30 MIN: CPT | Performed by: FAMILY MEDICINE

## 2022-05-24 PROCEDURE — 1036F TOBACCO NON-USER: CPT | Performed by: FAMILY MEDICINE

## 2022-05-24 PROCEDURE — 1090F PRES/ABSN URINE INCON ASSESS: CPT | Performed by: FAMILY MEDICINE

## 2022-05-24 PROCEDURE — 90677 PCV20 VACCINE IM: CPT | Performed by: FAMILY MEDICINE

## 2022-05-24 PROCEDURE — G8417 CALC BMI ABV UP PARAM F/U: HCPCS | Performed by: FAMILY MEDICINE

## 2022-05-24 RX ORDER — SUMATRIPTAN 100 MG/1
TABLET, FILM COATED ORAL
Qty: 9 TABLET | Refills: 11 | Status: SHIPPED | OUTPATIENT
Start: 2022-05-24

## 2022-05-24 SDOH — ECONOMIC STABILITY: FOOD INSECURITY: WITHIN THE PAST 12 MONTHS, THE FOOD YOU BOUGHT JUST DIDN'T LAST AND YOU DIDN'T HAVE MONEY TO GET MORE.: NEVER TRUE

## 2022-05-24 SDOH — ECONOMIC STABILITY: FOOD INSECURITY: WITHIN THE PAST 12 MONTHS, YOU WORRIED THAT YOUR FOOD WOULD RUN OUT BEFORE YOU GOT MONEY TO BUY MORE.: NEVER TRUE

## 2022-05-24 ASSESSMENT — PATIENT HEALTH QUESTIONNAIRE - PHQ9
SUM OF ALL RESPONSES TO PHQ9 QUESTIONS 1 & 2: 2
SUM OF ALL RESPONSES TO PHQ QUESTIONS 1-9: 2
1. LITTLE INTEREST OR PLEASURE IN DOING THINGS: 1
SUM OF ALL RESPONSES TO PHQ QUESTIONS 1-9: 2
SUM OF ALL RESPONSES TO PHQ QUESTIONS 1-9: 2
2. FEELING DOWN, DEPRESSED OR HOPELESS: 1
SUM OF ALL RESPONSES TO PHQ QUESTIONS 1-9: 2

## 2022-05-24 ASSESSMENT — SOCIAL DETERMINANTS OF HEALTH (SDOH): HOW HARD IS IT FOR YOU TO PAY FOR THE VERY BASICS LIKE FOOD, HOUSING, MEDICAL CARE, AND HEATING?: NOT HARD AT ALL

## 2022-05-24 NOTE — PROGRESS NOTES
2022      uQique Tamez (:  1955) is a 78 y.o. female,Established patient, here for evaluation of the following chief complaint(s): Annual Exam (overall doing well, left ovary removal last year but still having pain,  recently just passed, pt tearful, ) and Medication Refill        ASSESSMENT/PLAN     1. Other migraine without status migrainosus, not intractable  -     SUMAtriptan (IMITREX) 100 MG tablet; TAKE 1 AT THE ONSET OF HEADACHE MAY REPEAT IN 2 HOURS X 1 IF NEEDED., Disp-9 tablet, R-11Normal  2. Class 1 obesity due to excess calories without serious comorbidity with body mass index (BMI) of 33.0 to 33.9 in adult  3. Chronic leukopenia  -     CBC with Auto Differential; Future  4. Dyslipidemia  -     Comprehensive Metabolic Panel; Future  -     Lipid Panel; Future  5. Encounter for screening mammogram for malignant neoplasm of breast  -     [facility] MIRTHA DIGITAL SCREEN BILATERAL; Future  6. Grief reaction  7. Chronic LLQ pain  -     [facility] - Susan Chavarria MD, Gastroenterology, [address] [facility]  8. Need for pneumococcal vaccine  -     Pneumococcal Conjugate PCV20, PF (Prevnar 20)      Prevnar 20 today    Labs as above    Continue yearly mammograms    Refer to GI due to ongoing left lower quadrant pain and need for colon cancer screening    Med refill as above    Continue to work on healthy diet and activity to reduce weight    Follow-up yearly and as needed    SUBJECTIVE     Quique Tamez is a 78 y. o.female      Here for follow up of chronic health problems including:    Patient Active Problem List   Diagnosis    Migraine    Mitral valve prolapse    Class 1 obesity due to excess calories without serious comorbidity with body mass index (BMI) of 33.0 to 33.9 in adult    Chronic leukopenia     Patient reports ongoing left lower quadrant pain even after her left oophorectomy. She denies any bowel or bladder issues. She is overdue for colon cancer screening.   Unfortunately, her  passed away since her last visit here. She is grieving him but has support from her family and children. She is due for labs and mammogram.  Migraines are stable. Imitrex is effective when needed. Due for pneumococcal vaccine. Non-smoker. BMI 33.08. Review of Systems   Constitutional: Negative for chills, fatigue and fever. HENT: Negative. Eyes: Negative. Respiratory: Negative for shortness of breath. Cardiovascular: Negative for chest pain, palpitations and leg swelling. Gastrointestinal: Positive for abdominal pain (LLQ, intermittent). Negative for blood in stool, diarrhea, nausea and vomiting. Genitourinary: Negative for dysuria. Musculoskeletal: Negative for arthralgias and myalgias. Skin: Negative for rash. Neurological: Positive for headaches. Negative for dizziness. Hematological: Negative for adenopathy. Psychiatric/Behavioral: Negative. All other systems reviewed and are negative. OBJECTIVE     /70   Pulse 92   Temp 98.6 °F (37 °C) (Oral)   Resp 16   Ht 5' 7\" (1.702 m)   Wt 211 lb 3.2 oz (95.8 kg)   BMI 33.08 kg/m²     Wt Readings from Last 3 Encounters:   05/24/22 211 lb 3.2 oz (95.8 kg)   04/12/21 229 lb 12.8 oz (104.2 kg)   03/12/21 231 lb (104.8 kg)       Physical Exam  Vitals and nursing note reviewed. Constitutional:       General: She is not in acute distress. Appearance: She is well-developed. HENT:      Head: Normocephalic and atraumatic. Right Ear: Tympanic membrane normal.      Left Ear: Tympanic membrane normal.      Mouth/Throat:      Mouth: Mucous membranes are moist.      Pharynx: No posterior oropharyngeal erythema. Eyes:      Conjunctiva/sclera: Conjunctivae normal.   Neck:      Thyroid: No thyromegaly. Vascular: No carotid bruit. Cardiovascular:      Rate and Rhythm: Normal rate and regular rhythm. Heart sounds: No murmur heard.       Pulmonary:      Effort: Pulmonary effort is normal.      Breath sounds: Normal breath sounds. No wheezing. Abdominal:      General: Bowel sounds are normal.      Palpations: Abdomen is soft. Tenderness: There is abdominal tenderness (mild, LLQ). There is no guarding or rebound. Musculoskeletal:      Cervical back: Neck supple. Right lower leg: No edema. Left lower leg: No edema. Lymphadenopathy:      Cervical: No cervical adenopathy. Skin:     General: Skin is warm and dry. Findings: No rash. Neurological:      Mental Status: She is alert and oriented to person, place, and time. Psychiatric:         Behavior: Behavior normal.               Immunization History   Administered Date(s) Administered    Tdap (Boostrix, Adacel) 12/12/2011    Zoster Live (Zostavax) 04/03/2016         Health Maintenance   Topic Date Due    COVID-19 Vaccine (1) Never done    Hepatitis C screen  Never done    Shingles vaccine (2 of 3) 05/29/2016    Colorectal Cancer Screen  03/14/2019    Pneumococcal 65+ years Vaccine (1 - PCV) Never done    DTaP/Tdap/Td vaccine (2 - Td or Tdap) 12/12/2021    Depression Screen  01/11/2022    Annual Wellness Visit (AWV)  01/12/2022    Flu vaccine (Season Ended) 09/01/2022    Breast cancer screen  02/08/2023    Lipids  01/15/2026    DEXA (modify frequency per FRAX score)  Completed    Hepatitis A vaccine  Aged Out    Hepatitis B vaccine  Aged Out    Hib vaccine  Aged Out    Meningococcal (ACWY) vaccine  Aged Out           An electronic signature was used to authenticate this note.               Electronically signed by Flora Torres MD on 5/24/2022 at 2:51 PM

## 2022-05-25 ASSESSMENT — ENCOUNTER SYMPTOMS
BLOOD IN STOOL: 0
VOMITING: 0
NAUSEA: 0
ABDOMINAL PAIN: 1
EYES NEGATIVE: 1
DIARRHEA: 0
SHORTNESS OF BREATH: 0

## 2022-06-07 ENCOUNTER — HOSPITAL ENCOUNTER (OUTPATIENT)
Age: 67
Discharge: HOME | End: 2022-06-07
Payer: MEDICARE

## 2022-06-07 DIAGNOSIS — E78.5 DYSLIPIDEMIA: ICD-10-CM

## 2022-06-07 DIAGNOSIS — D72.819 CHRONIC LEUKOPENIA: ICD-10-CM

## 2022-06-07 LAB
ALBUMIN SERPL-MCNC: 4.2 G/DL (ref 3.5–5.1)
ALP BLD-CCNC: 84 U/L (ref 38–126)
ALT SERPL-CCNC: 11 U/L (ref 11–66)
ANION GAP SERPL CALCULATED.3IONS-SCNC: 11 MEQ/L (ref 8–16)
AST SERPL-CCNC: 14 U/L (ref 5–40)
BASOPHILS # BLD: 0.6 %
BASOPHILS ABSOLUTE: 0 THOU/MM3 (ref 0–0.1)
BILIRUB SERPL-MCNC: 0.3 MG/DL (ref 0.3–1.2)
BUN BLDV-MCNC: 14 MG/DL (ref 7–22)
CALCIUM SERPL-MCNC: 9.3 MG/DL (ref 8.5–10.5)
CHLORIDE BLD-SCNC: 105 MEQ/L (ref 98–111)
CHOLESTEROL, TOTAL: 218 MG/DL (ref 100–199)
CO2: 25 MEQ/L (ref 23–33)
CREAT SERPL-MCNC: 0.7 MG/DL (ref 0.4–1.2)
EOSINOPHIL # BLD: 2.2 %
EOSINOPHILS ABSOLUTE: 0.1 THOU/MM3 (ref 0–0.4)
ERYTHROCYTE [DISTWIDTH] IN BLOOD BY AUTOMATED COUNT: 13.7 % (ref 11.5–14.5)
ERYTHROCYTE [DISTWIDTH] IN BLOOD BY AUTOMATED COUNT: 48.9 FL (ref 35–45)
GFR SERPL CREATININE-BSD FRML MDRD: 84 ML/MIN/1.73M2
GLUCOSE BLD-MCNC: 90 MG/DL (ref 70–108)
HCT VFR BLD CALC: 41.1 % (ref 37–47)
HDLC SERPL-MCNC: 77 MG/DL
HEMOGLOBIN: 13 GM/DL (ref 12–16)
IMMATURE GRANS (ABS): 0.01 THOU/MM3 (ref 0–0.07)
IMMATURE GRANULOCYTES: 0.2 %
LDL CHOLESTEROL CALCULATED: 126 MG/DL
LYMPHOCYTES # BLD: 23.3 %
LYMPHOCYTES ABSOLUTE: 1.1 THOU/MM3 (ref 1–4.8)
MCH RBC QN AUTO: 30.9 PG (ref 26–33)
MCHC RBC AUTO-ENTMCNC: 31.6 GM/DL (ref 32.2–35.5)
MCV RBC AUTO: 97.6 FL (ref 81–99)
MONOCYTES # BLD: 10.8 %
MONOCYTES ABSOLUTE: 0.5 THOU/MM3 (ref 0.4–1.3)
NUCLEATED RED BLOOD CELLS: 0 /100 WBC
PLATELET # BLD: 192 THOU/MM3 (ref 130–400)
PMV BLD AUTO: 12 FL (ref 9.4–12.4)
POTASSIUM SERPL-SCNC: 5.3 MEQ/L (ref 3.5–5.2)
RBC # BLD: 4.21 MILL/MM3 (ref 4.2–5.4)
SEG NEUTROPHILS: 62.9 %
SEGMENTED NEUTROPHILS ABSOLUTE COUNT: 2.9 THOU/MM3 (ref 1.8–7.7)
SODIUM BLD-SCNC: 141 MEQ/L (ref 135–145)
TOTAL PROTEIN: 6.4 G/DL (ref 6.1–8)
TRIGL SERPL-MCNC: 74 MG/DL (ref 0–199)
WBC # BLD: 4.6 THOU/MM3 (ref 4.8–10.8)

## 2022-06-07 PROCEDURE — 36415 COLL VENOUS BLD VENIPUNCTURE: CPT

## 2022-06-07 PROCEDURE — 80053 COMPREHEN METABOLIC PANEL: CPT

## 2022-06-07 PROCEDURE — 80061 LIPID PANEL: CPT

## 2022-06-07 PROCEDURE — 85025 COMPLETE CBC W/AUTO DIFF WBC: CPT

## 2022-06-10 ENCOUNTER — TELEPHONE (OUTPATIENT)
Dept: FAMILY MEDICINE CLINIC | Age: 67
End: 2022-06-10

## 2022-06-10 DIAGNOSIS — E87.5 SERUM POTASSIUM ELEVATED: Primary | ICD-10-CM

## 2022-06-10 NOTE — TELEPHONE ENCOUNTER
----- Message from Leopoldo Pyle MD sent at 6/8/2022  8:54 AM EDT -----  Isamar Gains that her cholesterol is slightly elevated. Have her work on a 2000 INRIX and increased exercise to help with this. CBC shows slightly suppressed WBC count, which is chronic and stable for her. Metabolic panel shows minimally elevated potassium at 5.3. Have her stop any potassium supplements and decrease potassium in her diet. Recheck potassium level in 2 weeks.  CG

## 2022-06-14 NOTE — TELEPHONE ENCOUNTER
Pt notified. She said that she does not take a potassium supplement. She will watch her potassium in her diet. Lab ordered and mailed.

## 2022-07-05 ENCOUNTER — TELEPHONE (OUTPATIENT)
Dept: FAMILY MEDICINE CLINIC | Age: 67
End: 2022-07-05

## 2022-07-05 DIAGNOSIS — M81.0 OSTEOPOROSIS WITHOUT CURRENT PATHOLOGICAL FRACTURE, UNSPECIFIED OSTEOPOROSIS TYPE: Primary | ICD-10-CM

## 2022-07-05 DIAGNOSIS — Z78.0 POSTMENOPAUSAL: ICD-10-CM

## 2022-07-05 NOTE — TELEPHONE ENCOUNTER
Pt stopped in asking for an order for a Dexa Scan. Pt says it was discussed at her last visit. Pt will go to [facility] to send the order pt already made appt for next week to have it done.

## 2022-07-12 PROBLEM — M85.89 OSTEOPENIA OF MULTIPLE SITES: Status: ACTIVE | Noted: 2022-07-12

## 2022-07-13 ENCOUNTER — TELEPHONE (OUTPATIENT)
Dept: FAMILY MEDICINE CLINIC | Age: 67
End: 2022-07-13

## 2022-07-13 DIAGNOSIS — Z78.0 POSTMENOPAUSAL: Primary | ICD-10-CM

## 2022-07-13 DIAGNOSIS — Z87.39 HX OF OSTEOPENIA: ICD-10-CM

## 2022-07-13 DIAGNOSIS — Z87.39 HX OF OSTEOPOROSIS: ICD-10-CM

## 2022-07-13 DIAGNOSIS — M81.0 OSTEOPOROSIS WITHOUT CURRENT PATHOLOGICAL FRACTURE, UNSPECIFIED OSTEOPOROSIS TYPE: ICD-10-CM

## 2022-07-13 NOTE — TELEPHONE ENCOUNTER
----- Message from Sandy Gutierrez MD sent at 7/12/2022  4:35 PM EDT -----  DEXA shows osteopenia with lower FRAX score. I recommend 1200mg calcium + vitamin D supplementation daily. She should also do regular weightbearing exercise. Repeat DEXA in 2 years.  CG

## 2022-07-13 NOTE — TELEPHONE ENCOUNTER
Pt notified. DEXA scan ordered and mailed to pt. She would like to schedule it in 2 years for herself. Number provided.

## 2022-09-19 ENCOUNTER — HOSPITAL ENCOUNTER (OUTPATIENT)
Dept: CT IMAGING | Age: 67
Discharge: HOME | End: 2022-09-19
Payer: MEDICARE

## 2022-09-19 DIAGNOSIS — R10.32 ABDOMINAL PAIN, LEFT LOWER QUADRANT: ICD-10-CM

## 2022-09-19 LAB
CREAT SERPL-MCNC: 0.7 MG/DL (ref 0.5–1.2)
GFR, ESTIMATED: 89 ML/MIN/1.73M2

## 2022-09-19 PROCEDURE — 82565 ASSAY OF CREATININE: CPT

## 2022-09-19 PROCEDURE — 74177 CT ABD & PELVIS W/CONTRAST: CPT

## 2022-09-19 PROCEDURE — 6360000004 HC RX CONTRAST MEDICATION: Performed by: INTERNAL MEDICINE

## 2022-09-19 RX ADMIN — IOPAMIDOL 85 ML: 755 INJECTION, SOLUTION INTRAVENOUS at 15:04

## 2022-11-07 ENCOUNTER — TRANSCRIBE ORDERS (OUTPATIENT)
Dept: ADMINISTRATIVE | Age: 67
End: 2022-11-07

## 2022-11-07 DIAGNOSIS — K76.0 FATTY LIVER: Primary | ICD-10-CM

## 2022-11-14 ENCOUNTER — HOSPITAL ENCOUNTER (OUTPATIENT)
Dept: ULTRASOUND IMAGING | Age: 67
Discharge: HOME | End: 2022-11-14
Payer: MEDICARE

## 2022-11-14 DIAGNOSIS — K76.0 FATTY LIVER: ICD-10-CM

## 2022-11-14 PROCEDURE — 76705 ECHO EXAM OF ABDOMEN: CPT

## 2022-11-14 PROCEDURE — 93975 VASCULAR STUDY: CPT

## 2023-05-24 DIAGNOSIS — G43.809 OTHER MIGRAINE WITHOUT STATUS MIGRAINOSUS, NOT INTRACTABLE: ICD-10-CM

## 2023-05-24 RX ORDER — SUMATRIPTAN 100 MG/1
TABLET, FILM COATED ORAL
Qty: 9 TABLET | Refills: 0 | Status: SHIPPED | OUTPATIENT
Start: 2023-05-24

## 2023-05-24 NOTE — TELEPHONE ENCOUNTER
This medication refill is regarding a electronic request. Refill requested by  [address] . Requested Prescriptions     Pending Prescriptions Disp Refills    SUMAtriptan (IMITREX) 100 MG tablet [Pharmacy Med Name: SUMATRIPTAN SUCC 100 MG TABLET] 9 tablet 0     Sig: TAKE 1 AT THE ONSET OF HEADACHE MAY REPEAT IN 2 HOURS X 1 IF NEEDED. Date of last visit: 5/24/2022   Date of next visit: None  Date of last refill: 5/24/22 #9/11    Rx verified, ordered and set to EP.

## 2023-06-25 DIAGNOSIS — G43.809 OTHER MIGRAINE WITHOUT STATUS MIGRAINOSUS, NOT INTRACTABLE: ICD-10-CM

## 2023-06-26 RX ORDER — SUMATRIPTAN 100 MG/1
TABLET, FILM COATED ORAL
Qty: 9 TABLET | Refills: 0 | Status: SHIPPED | OUTPATIENT
Start: 2023-06-26

## 2023-07-25 DIAGNOSIS — G43.809 OTHER MIGRAINE WITHOUT STATUS MIGRAINOSUS, NOT INTRACTABLE: ICD-10-CM

## 2023-07-25 RX ORDER — SUMATRIPTAN 100 MG/1
TABLET, FILM COATED ORAL
Qty: 9 TABLET | Refills: 0 | Status: SHIPPED | OUTPATIENT
Start: 2023-07-25

## 2023-07-25 NOTE — TELEPHONE ENCOUNTER
Request sent from [facility] pharmacy for refill of imitrex 100 mg prn. Last seen 5/24/22, no future appt scheduled. Med verified. Order pended for short term as pt overdue for appt.

## 2023-07-25 NOTE — TELEPHONE ENCOUNTER
Rx EP'd to pharmacy. Please notify patient. Requested Prescriptions     Signed Prescriptions Disp Refills    SUMAtriptan (IMITREX) 100 MG tablet 9 tablet 0     Sig: TAKE 1 TABLET BY MOUTH AT THE ONSET OF A HEADACHE, MAY REPEAT IN 2 HOURS IF NEEDED     Authorizing Provider: Georgie Sweet for AWV. Please schedule.       Electronically signed by Aida Valladares MD on 7/25/2023 at 12:29 PM

## 2023-09-21 ENCOUNTER — HOSPITAL ENCOUNTER (OUTPATIENT)
Age: 68
Discharge: HOME | End: 2023-09-21
Payer: MEDICARE

## 2023-09-21 LAB
ALBUMIN SERPL BCG-MCNC: 4 G/DL (ref 3.5–5.1)
ALP SERPL-CCNC: 95 U/L (ref 38–126)
ALT SERPL W/O P-5'-P-CCNC: 13 U/L (ref 11–66)
ANION GAP SERPL CALC-SCNC: 12 MEQ/L (ref 8–16)
AST SERPL-CCNC: 16 U/L (ref 5–40)
BASOPHILS ABSOLUTE: 0 THOU/MM3 (ref 0–0.1)
BASOPHILS NFR BLD AUTO: 0.5 %
BILIRUB SERPL-MCNC: 0.4 MG/DL (ref 0.3–1.2)
BUN SERPL-MCNC: 20 MG/DL (ref 7–22)
CALCIUM SERPL-MCNC: 9.1 MG/DL (ref 8.5–10.5)
CHLORIDE SERPL-SCNC: 106 MEQ/L (ref 98–111)
CO2 SERPL-SCNC: 23 MEQ/L (ref 23–33)
CREAT SERPL-MCNC: 0.6 MG/DL (ref 0.4–1.2)
CRP SERPL-MCNC: < 0.3 MG/DL (ref 0–1)
DEPRECATED RDW RBC AUTO: 48.7 FL (ref 35–45)
EOSINOPHIL NFR BLD AUTO: 2.7 %
EOSINOPHILS ABSOLUTE: 0.1 THOU/MM3 (ref 0–0.4)
ERYTHROCYTE [DISTWIDTH] IN BLOOD BY AUTOMATED COUNT: 13.9 % (ref 11.5–14.5)
ERYTHROCYTE [SEDIMENTATION RATE] IN BLOOD BY WESTERGREN METHOD: 19 MM/HR (ref 0–20)
GFR SERPL CREATININE-BSD FRML MDRD: > 60 ML/MIN/1.73M2
GLUCOSE SERPL-MCNC: 90 MG/DL (ref 70–108)
HCT VFR BLD AUTO: 42 % (ref 37–47)
HGB BLD-MCNC: 13.4 GM/DL (ref 12–16)
IMM GRANULOCYTES # BLD AUTO: 0.01 THOU/MM3 (ref 0–0.07)
IMM GRANULOCYTES NFR BLD AUTO: 0.2 %
LYMPHOCYTES ABSOLUTE: 1.1 THOU/MM3 (ref 1–4.8)
LYMPHOCYTES NFR BLD AUTO: 24.5 %
MCH RBC QN AUTO: 30.3 PG (ref 26–33)
MCHC RBC AUTO-ENTMCNC: 31.9 GM/DL (ref 32.2–35.5)
MCV RBC AUTO: 95 FL (ref 81–99)
MONOCYTES ABSOLUTE: 0.4 THOU/MM3 (ref 0.4–1.3)
MONOCYTES NFR BLD AUTO: 8.1 %
NEUTROPHILS NFR BLD AUTO: 64 %
NRBC BLD AUTO-RTO: 0 /100 WBC
PLATELET # BLD AUTO: 187 THOU/MM3 (ref 130–400)
PMV BLD AUTO: 12.5 FL (ref 9.4–12.4)
POTASSIUM SERPL-SCNC: 3.9 MEQ/L (ref 3.5–5.2)
PROT SERPL-MCNC: 6.9 G/DL (ref 6.1–8)
RBC # BLD AUTO: 4.42 MILL/MM3 (ref 4.2–5.4)
SEGMENTED NEUTROPHILS ABSOLUTE COUNT: 2.8 THOU/MM3 (ref 1.8–7.7)
SODIUM SERPL-SCNC: 141 MEQ/L (ref 135–145)
WBC # BLD AUTO: 4.4 THOU/MM3 (ref 4.8–10.8)

## 2023-09-21 PROCEDURE — 86140 C-REACTIVE PROTEIN: CPT

## 2023-09-21 PROCEDURE — 80053 COMPREHEN METABOLIC PANEL: CPT

## 2023-09-21 PROCEDURE — 85302 CLOT INHIBIT PROT C ANTIGEN: CPT

## 2023-09-21 PROCEDURE — 85651 RBC SED RATE NONAUTOMATED: CPT

## 2023-09-21 PROCEDURE — 85305 CLOT INHIBIT PROT S TOTAL: CPT

## 2023-09-21 PROCEDURE — 36415 COLL VENOUS BLD VENIPUNCTURE: CPT

## 2023-09-21 PROCEDURE — 85025 COMPLETE CBC W/AUTO DIFF WBC: CPT

## 2023-09-24 LAB
PROT C AG ACT/NOR PPP IA: > 95 % (ref 63–153)
PROT S AG ACT/NOR PPP IA: 119 % (ref 63–126)

## 2024-03-04 ENCOUNTER — HOSPITAL ENCOUNTER (OUTPATIENT)
Age: 69
Discharge: HOME | End: 2024-03-04
Payer: MEDICARE

## 2024-03-04 LAB
HCT VFR BLD AUTO: 42.2 % (ref 37–47)
HGB BLD-MCNC: 13.8 GM/DL (ref 12–16)

## 2024-03-04 PROCEDURE — 85018 HEMOGLOBIN: CPT

## 2024-03-04 PROCEDURE — 36415 COLL VENOUS BLD VENIPUNCTURE: CPT

## 2024-03-04 PROCEDURE — 85014 HEMATOCRIT: CPT

## 2024-04-29 DIAGNOSIS — G43.809 OTHER MIGRAINE WITHOUT STATUS MIGRAINOSUS, NOT INTRACTABLE: ICD-10-CM

## 2024-04-29 RX ORDER — SUMATRIPTAN 100 MG/1
TABLET, FILM COATED ORAL
Qty: 9 TABLET | Refills: 0 | Status: SHIPPED | OUTPATIENT
Start: 2024-04-29

## 2024-04-29 NOTE — TELEPHONE ENCOUNTER
This medication refill is regarding a telephone request. Refill requested by patient.    Requested Prescriptions     Pending Prescriptions Disp Refills    SUMAtriptan (IMITREX) 100 MG tablet 9 tablet 0       Date of last visit: 5/24/2022   Date of next visit: 5/17/2024  Date of last refill: 7/25/23 for 9/0; will run out of medication prior to appointment  [facility]: [facility]    Rx verified, ordered and set to RAVEN WANG

## 2024-05-16 SDOH — HEALTH STABILITY: PHYSICAL HEALTH: ON AVERAGE, HOW MANY MINUTES DO YOU ENGAGE IN EXERCISE AT THIS LEVEL?: 60 MIN

## 2024-05-16 SDOH — ECONOMIC STABILITY: FOOD INSECURITY: WITHIN THE PAST 12 MONTHS, THE FOOD YOU BOUGHT JUST DIDN'T LAST AND YOU DIDN'T HAVE MONEY TO GET MORE.: NEVER TRUE

## 2024-05-16 SDOH — ECONOMIC STABILITY: HOUSING INSECURITY
IN THE LAST 12 MONTHS, WAS THERE A TIME WHEN YOU DID NOT HAVE A STEADY PLACE TO SLEEP OR SLEPT IN A SHELTER (INCLUDING NOW)?: NO

## 2024-05-16 SDOH — ECONOMIC STABILITY: FOOD INSECURITY: WITHIN THE PAST 12 MONTHS, YOU WORRIED THAT YOUR FOOD WOULD RUN OUT BEFORE YOU GOT MONEY TO BUY MORE.: NEVER TRUE

## 2024-05-16 SDOH — ECONOMIC STABILITY: TRANSPORTATION INSECURITY
IN THE PAST 12 MONTHS, HAS LACK OF TRANSPORTATION KEPT YOU FROM MEETINGS, WORK, OR FROM GETTING THINGS NEEDED FOR DAILY LIVING?: NO

## 2024-05-16 SDOH — HEALTH STABILITY: PHYSICAL HEALTH: ON AVERAGE, HOW MANY DAYS PER WEEK DO YOU ENGAGE IN MODERATE TO STRENUOUS EXERCISE (LIKE A BRISK WALK)?: 2 DAYS

## 2024-05-16 SDOH — ECONOMIC STABILITY: INCOME INSECURITY: HOW HARD IS IT FOR YOU TO PAY FOR THE VERY BASICS LIKE FOOD, HOUSING, MEDICAL CARE, AND HEATING?: NOT HARD AT ALL

## 2024-05-16 ASSESSMENT — PATIENT HEALTH QUESTIONNAIRE - PHQ9
1. LITTLE INTEREST OR PLEASURE IN DOING THINGS: NOT AT ALL
SUM OF ALL RESPONSES TO PHQ QUESTIONS 1-9: 1
SUM OF ALL RESPONSES TO PHQ9 QUESTIONS 1 & 2: 1
2. FEELING DOWN, DEPRESSED OR HOPELESS: SEVERAL DAYS
SUM OF ALL RESPONSES TO PHQ QUESTIONS 1-9: 1

## 2024-05-16 ASSESSMENT — LIFESTYLE VARIABLES
HOW MANY STANDARD DRINKS CONTAINING ALCOHOL DO YOU HAVE ON A TYPICAL DAY: 1 OR 2
HOW OFTEN DO YOU HAVE SIX OR MORE DRINKS ON ONE OCCASION: 1
HOW MANY STANDARD DRINKS CONTAINING ALCOHOL DO YOU HAVE ON A TYPICAL DAY: 1
HOW OFTEN DO YOU HAVE A DRINK CONTAINING ALCOHOL: 2
HOW OFTEN DO YOU HAVE A DRINK CONTAINING ALCOHOL: MONTHLY OR LESS

## 2024-05-17 ENCOUNTER — OFFICE VISIT (OUTPATIENT)
Dept: FAMILY MEDICINE CLINIC | Age: 69
End: 2024-05-17

## 2024-05-17 VITALS
BODY MASS INDEX: 32.46 KG/M2 | DIASTOLIC BLOOD PRESSURE: 82 MMHG | TEMPERATURE: 97.9 F | HEIGHT: 67 IN | RESPIRATION RATE: 16 BRPM | WEIGHT: 206.8 LBS | SYSTOLIC BLOOD PRESSURE: 124 MMHG | HEART RATE: 72 BPM

## 2024-05-17 DIAGNOSIS — D72.819 CHRONIC LEUKOPENIA: ICD-10-CM

## 2024-05-17 DIAGNOSIS — E78.5 DYSLIPIDEMIA: ICD-10-CM

## 2024-05-17 DIAGNOSIS — Z00.00 INITIAL MEDICARE ANNUAL WELLNESS VISIT: Primary | ICD-10-CM

## 2024-05-17 DIAGNOSIS — H93.13 TINNITUS OF BOTH EARS: ICD-10-CM

## 2024-05-17 DIAGNOSIS — Z12.31 ENCOUNTER FOR SCREENING MAMMOGRAM FOR MALIGNANT NEOPLASM OF BREAST: ICD-10-CM

## 2024-05-17 DIAGNOSIS — G43.809 OTHER MIGRAINE WITHOUT STATUS MIGRAINOSUS, NOT INTRACTABLE: ICD-10-CM

## 2024-05-17 RX ORDER — SUMATRIPTAN 100 MG/1
TABLET, FILM COATED ORAL
Qty: 9 TABLET | Refills: 11 | Status: SHIPPED | OUTPATIENT
Start: 2024-05-17

## 2024-05-17 NOTE — PROGRESS NOTES
movement, no respiratory distress  Cardiovascular: normal rate, regular rhythm, normal S1 and S2, no murmurs, rubs, clicks, or gallops, distal pulses intact, no carotid bruits  Abdomen: soft, non-tender, non-distended, normal bowel sounds, no masses or organomegaly  Extremities: no cyanosis, clubbing or edema  Musculoskeletal: normal range of motion, no joint swelling, deformity or tenderness       Allergies   Allergen Reactions    Sansert      Cardiac arrest     Prior to Visit Medications    Medication Sig Taking? Authorizing Provider   SUMAtriptan (IMITREX) 100 MG tablet TAKE 1 TABLET BY MOUTH AT THE ONSET OF A HEADACHE, MAY REPEAT IN 2 HOURS IF NEEDED Yes Elizabeth Cleary MD   ibuprofen (ADVIL;MOTRIN) 200 MG tablet Take 1 tablet by mouth every 6 hours as needed for Pain Yes ProviderSimin MD   acetaminophen (TYLENOL) 500 MG tablet Take 1 tablet by mouth every 6 hours as needed for Pain Yes ProviderSimin MD       CareTeam (Including outside providers/suppliers regularly involved in providing care):   Patient Care Team:  Elizabeth Cleary MD as PCP - General (Family Medicine)  Elizabeth Cleary MD as PCP - Empaneled Provider     Reviewed and updated this visit:  Tobacco  Allergies  Meds  Problems  Med Hx  Surg Hx  Soc Hx  Fam Hx               Electronically signed by Elizabeth Cleary MD on 5/17/2024 at 11:44 AM

## 2024-05-17 NOTE — PATIENT INSTRUCTIONS

## 2024-06-05 ENCOUNTER — HOSPITAL ENCOUNTER (OUTPATIENT)
Age: 69
Discharge: HOME | End: 2024-06-05
Payer: MEDICARE

## 2024-06-05 DIAGNOSIS — E78.5 DYSLIPIDEMIA: ICD-10-CM

## 2024-06-05 DIAGNOSIS — D72.819 CHRONIC LEUKOPENIA: ICD-10-CM

## 2024-06-05 LAB
ALBUMIN SERPL BCG-MCNC: 4 G/DL (ref 3.5–5.1)
ALP SERPL-CCNC: 82 U/L (ref 38–126)
ALT SERPL W/O P-5'-P-CCNC: 12 U/L (ref 11–66)
ANION GAP SERPL CALC-SCNC: 11 MEQ/L (ref 8–16)
AST SERPL-CCNC: 15 U/L (ref 5–40)
BASOPHILS ABSOLUTE: 0 THOU/MM3 (ref 0–0.1)
BASOPHILS NFR BLD AUTO: 0.5 %
BILIRUB SERPL-MCNC: 0.4 MG/DL (ref 0.3–1.2)
BUN SERPL-MCNC: 19 MG/DL (ref 7–22)
CALCIUM SERPL-MCNC: 9.3 MG/DL (ref 8.5–10.5)
CHLORIDE SERPL-SCNC: 105 MEQ/L (ref 98–111)
CHOLEST SERPL-MCNC: 210 MG/DL (ref 100–199)
CO2 SERPL-SCNC: 24 MEQ/L (ref 23–33)
CREAT SERPL-MCNC: 0.6 MG/DL (ref 0.4–1.2)
DEPRECATED RDW RBC AUTO: 52 FL (ref 35–45)
EOSINOPHIL NFR BLD AUTO: 2.6 %
EOSINOPHILS ABSOLUTE: 0.1 THOU/MM3 (ref 0–0.4)
ERYTHROCYTE [DISTWIDTH] IN BLOOD BY AUTOMATED COUNT: 14.6 % (ref 11.5–14.5)
GFR SERPL CREATININE-BSD FRML MDRD: > 90 ML/MIN/1.73M2
GLUCOSE SERPL-MCNC: 83 MG/DL (ref 70–108)
HCT VFR BLD AUTO: 41.7 % (ref 37–47)
HDLC SERPL-MCNC: 70 MG/DL
HGB BLD-MCNC: 13.4 GM/DL (ref 12–16)
IMM GRANULOCYTES # BLD AUTO: 0 THOU/MM3 (ref 0–0.07)
IMM GRANULOCYTES NFR BLD AUTO: 0 %
LDLC SERPL CALC-MCNC: 125 MG/DL
LYMPHOCYTES ABSOLUTE: 1.1 THOU/MM3 (ref 1–4.8)
LYMPHOCYTES NFR BLD AUTO: 27.5 %
MCH RBC QN AUTO: 31.2 PG (ref 26–33)
MCHC RBC AUTO-ENTMCNC: 32.1 GM/DL (ref 32.2–35.5)
MCV RBC AUTO: 97 FL (ref 81–99)
MONOCYTES ABSOLUTE: 0.4 THOU/MM3 (ref 0.4–1.3)
MONOCYTES NFR BLD AUTO: 9.8 %
NEUTROPHILS ABSOLUTE: 2.3 THOU/MM3 (ref 1.8–7.7)
NEUTROPHILS NFR BLD AUTO: 59.6 %
NRBC BLD AUTO-RTO: 0 /100 WBC
PLATELET # BLD AUTO: 189 THOU/MM3 (ref 130–400)
PMV BLD AUTO: 12.7 FL (ref 9.4–12.4)
POTASSIUM SERPL-SCNC: 4 MEQ/L (ref 3.5–5.2)
PROT SERPL-MCNC: 6.8 G/DL (ref 6.1–8)
RBC # BLD AUTO: 4.3 MILL/MM3 (ref 4.2–5.4)
SODIUM SERPL-SCNC: 140 MEQ/L (ref 135–145)
TRIGL SERPL-MCNC: 76 MG/DL (ref 0–199)
WBC # BLD AUTO: 3.9 THOU/MM3 (ref 4.8–10.8)

## 2024-06-05 PROCEDURE — 36415 COLL VENOUS BLD VENIPUNCTURE: CPT

## 2024-06-05 PROCEDURE — 85025 COMPLETE CBC W/AUTO DIFF WBC: CPT

## 2024-06-05 PROCEDURE — 80061 LIPID PANEL: CPT

## 2024-06-05 PROCEDURE — 80053 COMPREHEN METABOLIC PANEL: CPT

## 2025-05-22 DIAGNOSIS — G43.809 OTHER MIGRAINE WITHOUT STATUS MIGRAINOSUS, NOT INTRACTABLE: ICD-10-CM

## 2025-05-22 RX ORDER — SUMATRIPTAN SUCCINATE 100 MG/1
TABLET ORAL
Qty: 9 TABLET | Refills: 0 | Status: SHIPPED | OUTPATIENT
Start: 2025-05-22

## 2025-05-22 NOTE — TELEPHONE ENCOUNTER
Rx EP'd to pharmacy.  Please notify patient.      Requested Prescriptions     Signed Prescriptions Disp Refills    SUMAtriptan (IMITREX) 100 MG tablet 9 tablet 0     Sig: TAKE 1 TABLET BY MOUTH AT THE ONSET OF A HEADACHE, MAY REPEAT IN 2 HOURS IF NEEDED     Authorizing Provider: JENNIFER CLEARY for AWV.  Please schedule.      Electronically signed by Jennifer Cleary MD on 5/22/2025 at 10:08 AM

## 2025-05-22 NOTE — TELEPHONE ENCOUNTER
This medication refill is regarding a electronic request. Refill requested by patient.    Requested Prescriptions     Pending Prescriptions Disp Refills    SUMAtriptan (IMITREX) 100 MG tablet [Pharmacy Med Name: SUMATRIPTAN SUCC 100 MG TABLET] 9 tablet 11     Sig: TAKE 1 TABLET BY MOUTH AT THE ONSET OF A HEADACHE, MAY REPEAT IN 2 HOURS IF NEEDED     Date of last visit: 5/17/2024   Date of next visit: Visit date not found  Date of last refill: 5/17/24  Pharmacy Name:  [facility]/pharmacy #[id number] - [place] [id number] [facility] [phone] -  [phone]           Rx verified, ordered and set to EP.

## 2025-05-22 NOTE — TELEPHONE ENCOUNTER
Left patient a message letting patient know her medication has been sent to the pharmacy and that she is due for her AWV appointment to call us so we can schedule that appointment for her.

## 2025-06-18 DIAGNOSIS — G43.809 OTHER MIGRAINE WITHOUT STATUS MIGRAINOSUS, NOT INTRACTABLE: ICD-10-CM

## 2025-06-18 RX ORDER — SUMATRIPTAN SUCCINATE 100 MG/1
TABLET ORAL
Qty: 9 TABLET | Refills: 0 | Status: SHIPPED | OUTPATIENT
Start: 2025-06-18

## 2025-06-18 NOTE — TELEPHONE ENCOUNTER
This medication refill is regarding a electronic request. Refill requested by [address]    Requested Prescriptions     Pending Prescriptions Disp Refills    SUMAtriptan (IMITREX) 100 MG tablet [Pharmacy Med Name: SUMATRIPTAN SUCC 100 MG TABLET] 9 tablet 0     Sig: TAKE 1 TABLET BY MOUTH AT THE ONSET OF A HEADACHE, MAY REPEAT IN 2 HOURS IF NEEDED     Date of last visit: 5/17/2024   Date of next visit: 7/1/2025  Date of last refill: 5/22/25 #9/0    Rx verified, ordered and set to EP.

## 2025-07-01 ENCOUNTER — OFFICE VISIT (OUTPATIENT)
Dept: FAMILY MEDICINE CLINIC | Age: 70
End: 2025-07-01

## 2025-07-01 VITALS
TEMPERATURE: 97.7 F | HEIGHT: 66 IN | SYSTOLIC BLOOD PRESSURE: 140 MMHG | HEART RATE: 72 BPM | RESPIRATION RATE: 16 BRPM | WEIGHT: 229.4 LBS | DIASTOLIC BLOOD PRESSURE: 92 MMHG | BODY MASS INDEX: 36.87 KG/M2

## 2025-07-01 DIAGNOSIS — R53.83 OTHER FATIGUE: ICD-10-CM

## 2025-07-01 DIAGNOSIS — Z00.00 MEDICARE ANNUAL WELLNESS VISIT, SUBSEQUENT: Primary | ICD-10-CM

## 2025-07-01 DIAGNOSIS — G43.809 OTHER MIGRAINE WITHOUT STATUS MIGRAINOSUS, NOT INTRACTABLE: ICD-10-CM

## 2025-07-01 DIAGNOSIS — Z12.31 ENCOUNTER FOR SCREENING MAMMOGRAM FOR MALIGNANT NEOPLASM OF BREAST: ICD-10-CM

## 2025-07-01 DIAGNOSIS — E78.5 DYSLIPIDEMIA: ICD-10-CM

## 2025-07-01 RX ORDER — SUMATRIPTAN SUCCINATE 100 MG/1
TABLET ORAL
Qty: 9 TABLET | Refills: 11 | Status: SHIPPED | OUTPATIENT
Start: 2025-07-01

## 2025-07-01 SDOH — ECONOMIC STABILITY: FOOD INSECURITY: WITHIN THE PAST 12 MONTHS, THE FOOD YOU BOUGHT JUST DIDN'T LAST AND YOU DIDN'T HAVE MONEY TO GET MORE.: NEVER TRUE

## 2025-07-01 SDOH — ECONOMIC STABILITY: FOOD INSECURITY: WITHIN THE PAST 12 MONTHS, YOU WORRIED THAT YOUR FOOD WOULD RUN OUT BEFORE YOU GOT MONEY TO BUY MORE.: NEVER TRUE

## 2025-07-01 ASSESSMENT — PATIENT HEALTH QUESTIONNAIRE - PHQ9
1. LITTLE INTEREST OR PLEASURE IN DOING THINGS: NOT AT ALL
SUM OF ALL RESPONSES TO PHQ QUESTIONS 1-9: 0
SUM OF ALL RESPONSES TO PHQ QUESTIONS 1-9: 0
2. FEELING DOWN, DEPRESSED OR HOPELESS: NOT AT ALL
SUM OF ALL RESPONSES TO PHQ QUESTIONS 1-9: 0
SUM OF ALL RESPONSES TO PHQ QUESTIONS 1-9: 0

## 2025-07-01 ASSESSMENT — LIFESTYLE VARIABLES
HOW MANY STANDARD DRINKS CONTAINING ALCOHOL DO YOU HAVE ON A TYPICAL DAY: 1 OR 2
HOW OFTEN DO YOU HAVE A DRINK CONTAINING ALCOHOL: MONTHLY OR LESS

## 2025-07-01 NOTE — PROGRESS NOTES
[facility]  [facility]  [address]  [facility] [address]  Dept: [phone]  Loc: [phone]    7/1/2025    Chief Complaint   Patient presents with    Medicare AWV     Annual Wellness Visit. Labs complete.     Fatigue     Patient complaints of feelings fatigue and insomnia.          Medicare Annual Wellness Visit    Latisha Sung is here for Medicare AWV (Annual Wellness Visit. Labs complete. ) and Fatigue (Patient complaints of feelings fatigue and insomnia. )    Assessment & Plan   1. Medicare annual wellness visit, subsequent  2. Other fatigue  -     Comprehensive Metabolic Panel; Future  -     CBC with Auto Differential; Future  -     TSH; Future  -     T4, Free; Future  3. Dyslipidemia  -     Comprehensive Metabolic Panel; Future  -     Lipid Panel; Future  4. Encounter for screening mammogram for malignant neoplasm of breast  -     [facility] MIRTHA DIGITAL SCREEN BILATERAL; Future  5. Other migraine without status migrainosus, not intractable  -     SUMAtriptan (IMITREX) 100 MG tablet; TAKE 1 TABLET BY MOUTH AT THE ONSET OF A HEADACHE, MAY REPEAT IN 2 HOURS IF NEEDED, Disp-9 tablet, R-11Normal    Continue Imitrex as needed for migraine.  This chronic condition is stable and well-controlled.    Order given for yearly mammogram for breast cancer screening    Patient encouraged to get calcium 1200 mg daily plus vitamin D along with regular weightbearing exercise due to her history of osteopenia    Shingrix recommended.  She may obtain this at her local pharmacy if she desires.    Check CBC, CMP, TSH, and free T4 due to fatigue.  This is a chronic, uncontrolled issue that is addressed today in addition to her annual wellness visit.  If the labs are normal and no other source for her fatigue is found, I would recommend testing for sleep apnea.    Check lipid panel and CMP due to history of dyslipidemia    A significant and separately identifiable evaluation and

## 2025-07-01 NOTE — PATIENT INSTRUCTIONS
Many people expect to lose much more weight than is likely. A weight loss of 5% to 10% of your body weight may be enough to improve your health.  Get family and friends involved to provide support. Talk to them about why you are trying to lose weight, and ask them to help. They can help by participating in exercise and having meals with you, even if they may be eating something different.  Find what works best for you. If you do not have time or do not like to cook, a program that offers meal replacement bars or shakes may be better for you. Or if you like to prepare meals, finding a plan that includes daily menus and recipes may be best.  Ask your doctor about other health professionals who can help you achieve your weight-loss goals.  A dietitian can help you make healthy changes in your diet.  An exercise specialist or  can help you develop a safe and effective exercise program.  A counselor or psychiatrist can help you cope with issues such as depression, anxiety, or family problems that can make it hard to focus on weight loss.  Consider joining a support group for people who are trying to lose weight. Your doctor can suggest groups in your area.  Where can you learn more?  Go to https://www.[employer].net/patientEd and enter U357 to learn more about \"Starting a Weight-Loss Plan: Care Instructions.\"  Current as of: April 30, 2024  Content Version: 14.5  © 6047-5220 [employer].   Care instructions adapted under license by [employer]. If you have questions about a medical condition or this instruction, always ask your healthcare professional. [employer] disclaims any warranty or liability for your use of this information.         A Healthy Heart: Care Instructions  Overview     Coronary artery disease, also called heart disease, occurs when a substance called plaque builds up in the vessels that supply oxygen-rich blood to your heart muscle. This can narrow the blood

## 2025-07-11 ENCOUNTER — HOSPITAL ENCOUNTER (OUTPATIENT)
Age: 70
Discharge: HOME | End: 2025-07-11
Payer: MEDICARE

## 2025-07-11 DIAGNOSIS — R53.83 OTHER FATIGUE: ICD-10-CM

## 2025-07-11 DIAGNOSIS — E78.5 DYSLIPIDEMIA: ICD-10-CM

## 2025-07-11 LAB
ALBUMIN SERPL BCG-MCNC: 3.9 G/DL (ref 3.4–4.9)
ALP SERPL-CCNC: 89 U/L (ref 38–126)
ALT SERPL W/O P-5'-P-CCNC: 16 U/L (ref 10–35)
ANION GAP SERPL CALC-SCNC: 11 MEQ/L (ref 8–16)
AST SERPL-CCNC: 18 U/L (ref 10–35)
BASOPHILS ABSOLUTE: 0 THOU/MM3 (ref 0–0.1)
BASOPHILS NFR BLD AUTO: 0.5 %
BILIRUB SERPL-MCNC: 0.4 MG/DL (ref 0.3–1.2)
BUN SERPL-MCNC: 18 MG/DL (ref 8–23)
CALCIUM SERPL-MCNC: 9.4 MG/DL (ref 8.8–10.2)
CHLORIDE SERPL-SCNC: 106 MEQ/L (ref 98–111)
CHOLEST SERPL-MCNC: 211 MG/DL (ref 100–199)
CO2 SERPL-SCNC: 25 MEQ/L (ref 22–29)
CREAT SERPL-MCNC: 0.7 MG/DL (ref 0.5–0.9)
DEPRECATED RDW RBC AUTO: 46.7 FL (ref 35–45)
EOSINOPHIL NFR BLD AUTO: 1.6 %
EOSINOPHILS ABSOLUTE: 0.1 THOU/MM3 (ref 0–0.4)
ERYTHROCYTE [DISTWIDTH] IN BLOOD BY AUTOMATED COUNT: 13.4 % (ref 11.5–14.5)
GFR SERPL CREATININE-BSD FRML MDRD: > 90 ML/MIN/1.73M2
GLUCOSE SERPL-MCNC: 89 MG/DL (ref 74–109)
HCT VFR BLD AUTO: 40.9 % (ref 37–47)
HDLC SERPL-MCNC: 72 MG/DL
HGB BLD-MCNC: 13.5 GM/DL (ref 12–16)
IMM GRANULOCYTES # BLD AUTO: 0.01 THOU/MM3 (ref 0–0.07)
IMM GRANULOCYTES NFR BLD AUTO: 0.2 %
LDLC SERPL CALC-MCNC: 125 MG/DL
LYMPHOCYTES ABSOLUTE: 1.1 THOU/MM3 (ref 1–4.8)
LYMPHOCYTES NFR BLD AUTO: 26.3 %
MCH RBC QN AUTO: 30.9 PG (ref 26–33)
MCHC RBC AUTO-ENTMCNC: 33 GM/DL (ref 32.2–35.5)
MCV RBC AUTO: 93.6 FL (ref 81–99)
MONOCYTES ABSOLUTE: 0.5 THOU/MM3 (ref 0.4–1.3)
MONOCYTES NFR BLD AUTO: 11.3 %
NEUTROPHILS ABSOLUTE: 2.6 THOU/MM3 (ref 1.8–7.7)
NEUTROPHILS NFR BLD AUTO: 60.1 %
NRBC BLD AUTO-RTO: 0 /100 WBC
PLATELET # BLD AUTO: 211 THOU/MM3 (ref 130–400)
PMV BLD AUTO: 12.1 FL (ref 9.4–12.4)
POTASSIUM SERPL-SCNC: 4.7 MEQ/L (ref 3.5–5.2)
PROT SERPL-MCNC: 6.6 G/DL (ref 6.4–8.3)
RBC # BLD AUTO: 4.37 MILL/MM3 (ref 4.2–5.4)
SODIUM SERPL-SCNC: 142 MEQ/L (ref 135–145)
T4 FREE SERPL-MCNC: 1.1 NG/DL (ref 0.92–1.68)
TRIGL SERPL-MCNC: 69 MG/DL (ref 0–199)
TSH SERPL DL<=0.05 MIU/L-ACNC: 1.43 UIU/ML (ref 0.27–4.2)
WBC # BLD AUTO: 4.3 THOU/MM3 (ref 4.8–10.8)

## 2025-07-11 PROCEDURE — 80053 COMPREHEN METABOLIC PANEL: CPT

## 2025-07-11 PROCEDURE — 84439 ASSAY OF FREE THYROXINE: CPT

## 2025-07-11 PROCEDURE — 85025 COMPLETE CBC W/AUTO DIFF WBC: CPT

## 2025-07-11 PROCEDURE — 80061 LIPID PANEL: CPT

## 2025-07-11 PROCEDURE — 84443 ASSAY THYROID STIM HORMONE: CPT

## 2025-07-11 PROCEDURE — 36415 COLL VENOUS BLD VENIPUNCTURE: CPT

## (undated) DEVICE — TROCAR: Brand: KII FIOS FIRST ENTRY

## (undated) DEVICE — PAD,SANITARY,11 IN,MAXI,W/WINGS,N-STRL: Brand: MEDLINE

## (undated) DEVICE — INTENDED FOR TISSUE SEPARATION, AND OTHER PROCEDURES THAT REQUIRE A SHARP SURGICAL BLADE TO PUNCTURE OR CUT.: Brand: BARD-PARKER ® CARBON RIB-BACK BLADES

## (undated) DEVICE — PACK PROC LAP II AURORA

## (undated) DEVICE — 1840 FOAM BLOCK NEEDLE COUNTER: Brand: DEVON

## (undated) DEVICE — BAG SPEC REM 224ML W4XL6IN DIA10MM 1 HND GYN DISP ENDOPCH

## (undated) DEVICE — KIT,ANTI FOG,W/SPONGE & FLUID,SOFT PACK: Brand: MEDLINE

## (undated) DEVICE — GAUZE,SPONGE,8"X4",12PLY,XRAY,STRL,LF: Brand: MEDLINE

## (undated) DEVICE — Z DISCONTINUED BY MEDLINE USE 2711682 TRAY SKIN PREP DRY W/ PREM GLV

## (undated) DEVICE — CHLORAPREP 26ML ORANGE

## (undated) DEVICE — SOLUTION IRRIG 1500ML STRL H2O USP POUR PLAS BTL

## (undated) DEVICE — STRIP,CLOSURE,WOUND,MEDI-STRIP,1/2X4: Brand: MEDLINE

## (undated) DEVICE — HYPODERMIC SAFETY NEEDLE: Brand: MAGELLAN

## (undated) DEVICE — SUTURE VCRL SZ 4-0 L27IN ABSRB UD L19MM FS-2 3/8 CIR REV J422H

## (undated) DEVICE — SEALER REPROCESS LIGASURE VESSEL BLUNT TIP LAPSCP

## (undated) DEVICE — SYRINGE MED 10ML LUERLOCK TIP W/O SFTY DISP

## (undated) DEVICE — GLOVE SURG SZ 65 THK91MIL LTX FREE SYN POLYISOPRENE

## (undated) DEVICE — SOLUTION SCRB 4OZ 4% CHG H2O AIDED FOR PREOPERATIVE SKIN

## (undated) DEVICE — SYRINGE MED 30ML STD CLR PLAS LUERLOCK TIP N CTRL DISP

## (undated) DEVICE — SURE SET SINGLE BASIN-LF: Brand: MEDLINE INDUSTRIES, INC.

## (undated) DEVICE — RED RUBBER ROBINSON URETHRAL CATHETER, RADIOPAQUE, SMOOTH ROUNDED TIP, 14 FR (4.7 MM): Brand: DOVER